# Patient Record
Sex: MALE | Race: WHITE | NOT HISPANIC OR LATINO | Employment: FULL TIME | URBAN - METROPOLITAN AREA
[De-identification: names, ages, dates, MRNs, and addresses within clinical notes are randomized per-mention and may not be internally consistent; named-entity substitution may affect disease eponyms.]

---

## 2017-03-23 ENCOUNTER — COMMUNICATION - HEALTHEAST (OUTPATIENT)
Dept: FAMILY MEDICINE | Facility: CLINIC | Age: 57
End: 2017-03-23

## 2017-03-23 DIAGNOSIS — F41.8 PERFORMANCE ANXIETY: ICD-10-CM

## 2017-04-24 ENCOUNTER — OFFICE VISIT - HEALTHEAST (OUTPATIENT)
Dept: FAMILY MEDICINE | Facility: CLINIC | Age: 57
End: 2017-04-24

## 2017-04-24 DIAGNOSIS — Z00.00 ROUTINE ADULT HEALTH MAINTENANCE: ICD-10-CM

## 2017-04-24 DIAGNOSIS — N40.0 BPH (BENIGN PROSTATIC HYPERPLASIA): ICD-10-CM

## 2017-04-24 DIAGNOSIS — F41.8 PERFORMANCE ANXIETY: ICD-10-CM

## 2017-04-24 DIAGNOSIS — N52.9 ERECTILE DYSFUNCTION: ICD-10-CM

## 2017-04-24 LAB
CHOLEST SERPL-MCNC: 193 MG/DL
FASTING STATUS PATIENT QL REPORTED: YES
HBA1C MFR BLD: 5.3 % (ref 3.5–6)
HDLC SERPL-MCNC: 62 MG/DL
LDLC SERPL CALC-MCNC: 118 MG/DL
PSA SERPL-MCNC: 0.3 NG/ML (ref 0–3.5)
TRIGL SERPL-MCNC: 63 MG/DL

## 2017-04-24 ASSESSMENT — MIFFLIN-ST. JEOR: SCORE: 1631.13

## 2017-04-27 ENCOUNTER — COMMUNICATION - HEALTHEAST (OUTPATIENT)
Dept: FAMILY MEDICINE | Facility: CLINIC | Age: 57
End: 2017-04-27

## 2017-05-02 ENCOUNTER — OFFICE VISIT - HEALTHEAST (OUTPATIENT)
Dept: FAMILY MEDICINE | Facility: CLINIC | Age: 57
End: 2017-05-02

## 2017-05-02 DIAGNOSIS — S39.012A LOW BACK STRAIN, INITIAL ENCOUNTER: ICD-10-CM

## 2017-05-02 DIAGNOSIS — M62.830 SPASM OF MUSCLE, BACK: ICD-10-CM

## 2017-08-02 ENCOUNTER — COMMUNICATION - HEALTHEAST (OUTPATIENT)
Dept: FAMILY MEDICINE | Facility: CLINIC | Age: 57
End: 2017-08-02

## 2017-08-02 DIAGNOSIS — F41.8 PERFORMANCE ANXIETY: ICD-10-CM

## 2020-01-29 ENCOUNTER — RECORDS - HEALTHEAST (OUTPATIENT)
Dept: ADMINISTRATIVE | Facility: OTHER | Age: 60
End: 2020-01-29

## 2020-10-27 ENCOUNTER — OFFICE VISIT - HEALTHEAST (OUTPATIENT)
Dept: FAMILY MEDICINE | Facility: CLINIC | Age: 60
End: 2020-10-27

## 2020-10-27 DIAGNOSIS — L98.9 SKIN LESION: ICD-10-CM

## 2020-10-27 DIAGNOSIS — H53.9 VISION CHANGES: ICD-10-CM

## 2020-10-27 DIAGNOSIS — M25.511 RIGHT SHOULDER PAIN, UNSPECIFIED CHRONICITY: ICD-10-CM

## 2020-10-27 DIAGNOSIS — Z12.11 SPECIAL SCREENING FOR MALIGNANT NEOPLASMS, COLON: ICD-10-CM

## 2020-10-27 ASSESSMENT — MIFFLIN-ST. JEOR: SCORE: 1616.31

## 2021-04-07 ENCOUNTER — OFFICE VISIT - HEALTHEAST (OUTPATIENT)
Dept: FAMILY MEDICINE | Facility: CLINIC | Age: 61
End: 2021-04-07

## 2021-04-07 DIAGNOSIS — Z12.11 SCREEN FOR COLON CANCER: ICD-10-CM

## 2021-04-07 DIAGNOSIS — L98.8 SKIN PLAQUE: ICD-10-CM

## 2021-04-07 DIAGNOSIS — Z00.00 ROUTINE GENERAL MEDICAL EXAMINATION AT A HEALTH CARE FACILITY: ICD-10-CM

## 2021-04-07 DIAGNOSIS — Z12.5 SCREENING FOR PROSTATE CANCER: ICD-10-CM

## 2021-04-07 DIAGNOSIS — Z13.220 LIPID SCREENING: ICD-10-CM

## 2021-04-07 DIAGNOSIS — Z11.59 ENCOUNTER FOR HEPATITIS C SCREENING TEST FOR LOW RISK PATIENT: ICD-10-CM

## 2021-04-07 DIAGNOSIS — Z11.4 SCREENING FOR HIV WITHOUT PRESENCE OF RISK FACTORS: ICD-10-CM

## 2021-04-07 LAB
ALBUMIN SERPL-MCNC: 4 G/DL (ref 3.5–5)
ALP SERPL-CCNC: 69 U/L (ref 45–120)
ALT SERPL W P-5'-P-CCNC: 28 U/L (ref 0–45)
ANION GAP SERPL CALCULATED.3IONS-SCNC: 9 MMOL/L (ref 5–18)
AST SERPL W P-5'-P-CCNC: 26 U/L (ref 0–40)
BILIRUB SERPL-MCNC: 0.8 MG/DL (ref 0–1)
BUN SERPL-MCNC: 17 MG/DL (ref 8–22)
CALCIUM SERPL-MCNC: 8.8 MG/DL (ref 8.5–10.5)
CHLORIDE BLD-SCNC: 106 MMOL/L (ref 98–107)
CHOLEST SERPL-MCNC: 210 MG/DL
CO2 SERPL-SCNC: 22 MMOL/L (ref 22–31)
CREAT SERPL-MCNC: 0.78 MG/DL (ref 0.7–1.3)
ERYTHROCYTE [DISTWIDTH] IN BLOOD BY AUTOMATED COUNT: 12.5 % (ref 11–14.5)
FASTING STATUS PATIENT QL REPORTED: YES
GFR SERPL CREATININE-BSD FRML MDRD: >60 ML/MIN/1.73M2
GLUCOSE BLD-MCNC: 87 MG/DL (ref 70–125)
HCT VFR BLD AUTO: 45 % (ref 40–54)
HDLC SERPL-MCNC: 69 MG/DL
HGB BLD-MCNC: 15.3 G/DL (ref 14–18)
HIV 1+2 AB+HIV1 P24 AG SERPL QL IA: NEGATIVE
LDLC SERPL CALC-MCNC: 129 MG/DL
MCH RBC QN AUTO: 30.4 PG (ref 27–34)
MCHC RBC AUTO-ENTMCNC: 34 G/DL (ref 32–36)
MCV RBC AUTO: 89 FL (ref 80–100)
PLATELET # BLD AUTO: 201 THOU/UL (ref 140–440)
PMV BLD AUTO: 9.1 FL (ref 7–10)
POTASSIUM BLD-SCNC: 4.2 MMOL/L (ref 3.5–5)
PROT SERPL-MCNC: 6.5 G/DL (ref 6–8)
PSA SERPL-MCNC: 0.5 NG/ML (ref 0–4.5)
RBC # BLD AUTO: 5.04 MILL/UL (ref 4.4–6.2)
SODIUM SERPL-SCNC: 137 MMOL/L (ref 136–145)
TRIGL SERPL-MCNC: 62 MG/DL
WBC: 3.9 THOU/UL (ref 4–11)

## 2021-04-07 RX ORDER — TRIAMCINOLONE ACETONIDE 0.25 MG/G
CREAM TOPICAL
Qty: 15 G | Refills: 0 | Status: SHIPPED | OUTPATIENT
Start: 2021-04-07 | End: 2024-01-29

## 2021-04-07 ASSESSMENT — MIFFLIN-ST. JEOR: SCORE: 1632.83

## 2021-04-08 ENCOUNTER — COMMUNICATION - HEALTHEAST (OUTPATIENT)
Dept: FAMILY MEDICINE | Facility: CLINIC | Age: 61
End: 2021-04-08

## 2021-04-08 LAB — HCV AB SERPL QL IA: NEGATIVE

## 2021-05-15 ENCOUNTER — OFFICE VISIT - HEALTHEAST (OUTPATIENT)
Dept: FAMILY MEDICINE | Facility: CLINIC | Age: 61
End: 2021-05-15

## 2021-05-15 DIAGNOSIS — U07.1 BRONCHITIS DUE TO COVID-19 VIRUS: ICD-10-CM

## 2021-05-15 DIAGNOSIS — J40 BRONCHITIS DUE TO COVID-19 VIRUS: ICD-10-CM

## 2021-05-15 DIAGNOSIS — R06.02 SOB (SHORTNESS OF BREATH): ICD-10-CM

## 2021-05-15 RX ORDER — ALBUTEROL SULFATE 90 UG/1
2 AEROSOL, METERED RESPIRATORY (INHALATION) EVERY 4 HOURS PRN
Qty: 1 EACH | Refills: 1 | Status: SHIPPED | OUTPATIENT
Start: 2021-05-15 | End: 2023-10-13

## 2021-05-27 VITALS
DIASTOLIC BLOOD PRESSURE: 76 MMHG | WEIGHT: 187.38 LBS | HEART RATE: 56 BPM | SYSTOLIC BLOOD PRESSURE: 114 MMHG | OXYGEN SATURATION: 96 % | TEMPERATURE: 97.8 F

## 2021-05-28 ENCOUNTER — RECORDS - HEALTHEAST (OUTPATIENT)
Dept: ADMINISTRATIVE | Facility: CLINIC | Age: 61
End: 2021-05-28

## 2021-05-30 ENCOUNTER — RECORDS - HEALTHEAST (OUTPATIENT)
Dept: ADMINISTRATIVE | Facility: CLINIC | Age: 61
End: 2021-05-30

## 2021-05-30 VITALS — WEIGHT: 186 LBS | HEIGHT: 69 IN | BODY MASS INDEX: 27.55 KG/M2

## 2021-06-01 ENCOUNTER — RECORDS - HEALTHEAST (OUTPATIENT)
Dept: ADMINISTRATIVE | Facility: CLINIC | Age: 61
End: 2021-06-01

## 2021-06-05 VITALS
HEIGHT: 68 IN | HEART RATE: 60 BPM | WEIGHT: 185.2 LBS | RESPIRATION RATE: 16 BRPM | DIASTOLIC BLOOD PRESSURE: 70 MMHG | OXYGEN SATURATION: 98 % | SYSTOLIC BLOOD PRESSURE: 101 MMHG | BODY MASS INDEX: 28.07 KG/M2

## 2021-06-05 VITALS
HEART RATE: 55 BPM | HEIGHT: 68 IN | TEMPERATURE: 97.5 F | BODY MASS INDEX: 28.64 KG/M2 | DIASTOLIC BLOOD PRESSURE: 71 MMHG | SYSTOLIC BLOOD PRESSURE: 105 MMHG | WEIGHT: 189 LBS

## 2021-06-10 NOTE — PROGRESS NOTES
Assessment:      Healthy male exam.    Erectile dysfunction    Plan:   `  Labs include: CBC, CMP, hemoglobin A1c, testosterone, lipid cascade, PSA  Referral to urology for further evaluation of erectile dysfunction, BPH  Refills of propranolol and finasteride  Follow-up in 1 year for annual exam or sooner as needed.  Subjective:      Braden Aleman is a 56 y.o. male who presents for an annual exam. The patient reports that there is not domestic violence in his life.     Patient has several complaints today.  Erectile dysfunction: Has been ongoing lately, difficulty getting an erection, decreased libido.  Patient denies any history of coronary artery disease, diabetes, circulatory diseases.  Denies mental illness including depression or anxiety.  No significant stress in his life.  Has had a history of BPH is been a while since he seen a urologist for this has been taking finasteride in the past for it.  Would like to have his testosterone and PSA checked today.    Healthy Habits:   Regular Exercise: Yes  Baseball, running, lifting every day  Sunscreen Use: Yes  Healthy Diet: Yes  Dental Visits Regularly: No  Seat Belt: Yes  Sexually active: Yes  Testicular self awareness:  Yes  Hemoccults: No  Flex Sig: N/A  Colonoscopy: Yes 2011  Lipid Profile: Yes  Glucose Screen: Yes  Prevention of Osteoporosis: N/A  Last Dexa: N/A  Guns at Home:  Yes  Guns Safety Locks:  No      Immunization History   Administered Date(s) Administered     Hep A, Adult 02/17/2014     Hep A, historic 12/27/2007     Influenza, inj, historic 12/27/2007     Influenza, seasonal,quad inj 36+ mos 10/27/2015     Influenza, seasonal,quad inj 6-35 mos 10/05/2010, 09/16/2011, 01/14/2013     Influenza,seasonal quad, PF 02/17/2014     Td, adult adsorbed, PF 10/27/2015     Tdap 03/22/2006     Immunization status: up to date and documented.    No exam data present     Current Outpatient Prescriptions   Medication Sig Dispense Refill     ascorbic acid (ASCORBIC  ACID WITH LENA HIPS) 500 MG tablet Take 500 mg by mouth daily.       b complex vitamins tablet Take 1 tablet by mouth daily.       CALCIUM CARB/MAGNESIUM CMB #10 (MAYITO-MAG ORAL) Take by mouth daily.       GLUCOSAM SUL NA/CHONDR BOYCE A NA (GLUCOSAMINE & CHONDROIT SUL.NA ORAL) Take by mouth daily.       multivitamin capsule Take 1 capsule by mouth daily.       omega-3 fatty acids (FISH OIL) 500 mg cap Take by mouth daily.       propranolol (INDERAL) 10 MG tablet TAKE 1 OR 2 TABLETS BY MOUTH EVERY DAY AS NEEDED. 30 tablet 3     protein Powd Take by mouth daily.       SUMAtriptan (IMITREX) 50 MG tablet Take 50 mg by mouth every 2 (two) hours as needed for migraine.       finasteride (PROSCAR) 5 mg tablet Take 1 tablet (5 mg total) by mouth daily. 90 tablet 3     No current facility-administered medications for this visit.      Past Medical History:   Diagnosis Date     Kidney stones      No past surgical history on file.  Review of patient's allergies indicates no known allergies.  Family History   Problem Relation Age of Onset     Breast cancer Mother      Multiple sclerosis Mother      Heart disease Sister      Depression Sister      Multiple sclerosis Sister      Social History     Social History     Marital status:      Spouse name: N/A     Number of children: N/A     Years of education: N/A     Occupational History           Social History Main Topics     Smoking status: Passive Smoke Exposure - Never Smoker     Smokeless tobacco: Not on file      Comment: spouse outside     Alcohol use Yes      Comment: 2-4 drinks per week     Drug use: No     Sexual activity: Not on file     Other Topics Concern     Not on file     Social History Narrative       Review of Systems  Review of Systems      Review of Systems  General:  Denies problem  Eyes: Denies problem  Ears/Nose/Throat: Denies problem  Cardiovascular: Denies problem  Respiratory:  Denies problem  Gastrointestinal:  Denies problem  Genitourinary:  "increased problems with having erections, less of a labido  Musculoskeletal:  Denies problem  Skin: Denies problem  Neurologic: occasional lightheadedness after working; has been awhile since episode  Psychiatric: Denies problem  Endocrine: Denies problem  Heme/Lymphatic: Denies problem   Allergic/Immunologic: Denies problem    Objective:     Vitals:    04/24/17 1026   BP: 106/70   Pulse: (!) 58   Resp: 16   Temp: 98.4  F (36.9  C)   TempSrc: Oral   Weight: 186 lb (84.4 kg)   Height: 5' 8.5\" (1.74 m)     Body mass index is 27.87 kg/(m^2).    Physical  Physical Exam     Vitals:    04/24/17 1026   BP: 106/70   Pulse: (!) 58   Resp: 16   Temp: 98.4  F (36.9  C)   TempSrc: Oral   Weight: 186 lb (84.4 kg)   Height: 5' 8.5\" (1.74 m)     Body mass index is 27.87 kg/(m^2).    Physical  General Appearance: Alert, cooperative, no distress, appears stated age  Head: Normocephalic, without obvious abnormality, atraumatic  Eyes: PERRL, conjunctiva/corneas clear, EOM's intact  Ears: Normal TM's and external ear canals, both ears  Nose: Nares normal, septum midline,mucosa normal, no drainage  Throat: Lips, mucosa, and tongue normal; teeth and gums normal  Neck: Supple, symmetrical, trachea midline, no adenopathy;  thyroid: not enlarged, symmetric, no tenderness/mass/nodules  Back: Symmetric, no curvature, ROM normal, no CVA tenderness  Lungs: Clear to auscultation bilaterally, respirations unlabored  Heart: Regular rate and rhythm, S1 and S2 normal, no murmur, rub, or gallop,  Abdomen: Soft, non-tender, bowel sounds active all four quadrants,  no masses, no organomegaly  Genitourinary: deferred  Musculoskeletal: Normal range of motion. No joint swelling or deformity.   Extremities: Extremities normal, atraumatic, no cyanosis or edema  Skin: Skin color, texture, turgor normal, no rashes or lesions  Lymph nodes: Cervical, supraclavicular, and axillary nodes normal  Neurologic: He is alert. He has normal reflexes. Cranial nerves " intact., normal balance  Psychiatric: He has a normal mood and affect.

## 2021-06-10 NOTE — PROGRESS NOTES
Assessment & Plan:  1. Spasm of muscle, back  Flexeril nightly to relax musculature.   - cyclobenzaprine (FLEXERIL) 5 MG tablet; Take 1 tablet (5 mg total) by mouth every 8 (eight) hours as needed for muscle spasms.  Dispense: 20 tablet; Refill: 0    2. Low back strain, initial encounter  Negative leg raise, pain localized and reproducible with palpation. We will trial flexeril nightly, as well as Aleve on a scheduled basis for 1-2 weeks. If symptoms not improving or worsening, consider imaging. Patient also encouraged to use heat and ice as needed. May also use topical analgesics such as capsaicin ointment.       Patient Instructions           No orders of the defined types were placed in this encounter.    There are no discontinued medications.        Chief Complaint:   Chief Complaint   Patient presents with     Back Pain     back pain that goes into the left side/hip        History of Present Illness:  Braden is a 56 y.o. male presenting to the clinic today with left-sided low back pain that began about 4 days ago.  Pain started to be bothersome after the patient pitched in a softball game over the weekend.  It was tolerable at that point but then he went to work on his boat on Sunday and was in odd positions all day.  After finishing with the boat he noticed worsening pain in the left low back.  Pain radiates but no further than the side or the hip.  No shooting pains into the buttocks or down the leg.  Pain feels like spasm.  He has tried heat and ice at home but no other over-the-counter medications.  No history of other injury prior to the spine or low back..     Review of Systems:  All other systems are negative except as noted above.    PFS:  Reviewed and updated.     Tobacco Use:  History   Smoking Status     Passive Smoke Exposure - Never Smoker   Smokeless Tobacco     Not on file     Comment: spouse outside       Vitals:  Vitals:    05/02/17 1353   BP: 124/84   Patient Site: Right Arm   Patient  Position: Sitting   Cuff Size: Adult Regular   Pulse: 74   Resp: 16     Wt Readings from Last 3 Encounters:   04/24/17 186 lb (84.4 kg)   10/27/15 186 lb 4 oz (84.5 kg)       Physical Exam:  Constitutional:  Reveals an alert, cooperative, 56 year old male in no acute distress.  Vitals:  Per nursing notes.  Musculoskeletal: left low  to palpation, pain reproducible with palpation. straight leg raise negative bilaterally.  Psychiatric:  Mood appropriate, memory intact.     Data Reviewed:  Additional History from Old Records or Another Person Summarized (2 total): None.     Decision to Obtain Extra information (1 total): None.     Radiology Tests Summarized and Ordered (XRAY/CT/MRI/DXA) (1 total): None.    Labs Reviewed and Ordered (1 total): None.    Medicine Tests Summarized and Ordered (EKG/ECHO/COLONOSCOPY/EGD) (1 total): None.    Independent Review of EKG or X-Ray (2 each): None.    The visit lasted a total of 25 minutes face to face with the patient. Over 50% of the time was spent counseling and educating the patient about plan of care.    Medications:  Current Outpatient Prescriptions   Medication Sig Dispense Refill     ascorbic acid (ASCORBIC ACID WITH LENA HIPS) 500 MG tablet Take 500 mg by mouth daily.       b complex vitamins tablet Take 1 tablet by mouth daily.       CALCIUM CARB/MAGNESIUM CMB #10 (MAYITO-MAG ORAL) Take by mouth daily.       finasteride (PROSCAR) 5 mg tablet Take 1 tablet (5 mg total) by mouth daily. 90 tablet 3     GLUCOSAM SUL NA/CHONDR BOYCE A NA (GLUCOSAMINE & CHONDROIT SUL.NA ORAL) Take by mouth daily.       multivitamin capsule Take 1 capsule by mouth daily.       omega-3 fatty acids (FISH OIL) 500 mg cap Take by mouth daily.       propranolol (INDERAL) 10 MG tablet TAKE 1 OR 2 TABLETS BY MOUTH EVERY DAY AS NEEDED. 30 tablet 3     protein Powd Take by mouth daily.       SUMAtriptan (IMITREX) 50 MG tablet Take 50 mg by mouth every 2 (two) hours as needed for migraine.        cyclobenzaprine (FLEXERIL) 5 MG tablet Take 1 tablet (5 mg total) by mouth every 8 (eight) hours as needed for muscle spasms. 20 tablet 0     No current facility-administered medications for this visit.        Total Data Points: 0    ALLIOSN Yu, CNP    This note has been dictated using voice recognition software. Any grammatical or context distortions are unintentional and inherent to the software

## 2021-06-12 NOTE — PROGRESS NOTES
Assessment:     1. Vision changes  Ambulatory referral to Ophthalmology   2. Skin lesion  Ambulatory referral to Dermatology   3. Special screening for malignant neoplasms, colon  Ambulatory referral for Colonoscopy   4. Right shoulder pain, unspecified chronicity       Referral to ophthalmology.  Referral to dermatology.  Regarding right shoulder pain, shoulder rehab exercises back is provided to the patient.  Patient does have orthopedics that he can follow-up if not improving or worsening.  Patient verbalizes understanding.     Plan:      The diagnosis was discussed with the patient and evaluation and treatment plans outlined.  Follow up: Return in about 3 months (around 1/27/2021) for Annual physical.     Subjective:      Braden Aleman is a  male who presents for evaluation of   Chief Complaint   Patient presents with     Eye Problem     Pt here today to evaluate clouding/ film in vision of LT eye, sees dark black spotLT side mild HA x 1 d     Flu Vaccine     Flublok     Skin Problem     Pt has a couple spots on chest that has been itching     Patient works at the [employer].  Yesterday morning he woke up and felt that his left eye is cloudy and then he felt some floaters in his left eye.  He has had floaters in the eye in the past also but his vision with cloudiness was not present.  He denies any curtain coming in his front of your loss of vision.  These leading questions are asked because there is family history of multiple sclerosis.  He does not report any dizziness or any neurological signs or symptoms.  He does play baseball in the community and is actually going to [place] on the weekend to participate in baseball matches.  He wants to make sure that he is safe to travel.  He denies any eye injury.  Patient has had a couple of spots on his right chest and was asked to monitor it and return if they are changing of the become itchy.  He feels they are enlarging and are little rough and itchy  "now.  Patient has had bilateral rotator cuff repair but now feels that his right shoulder is aching due to his baseball.  He is started doing his exercises for conditioning again.    The following portions of the patient's history were reviewed and updated as appropriate: allergies, current medications, past family history, past medical history, past social history, past surgical history and problem list.    Review of Systems  Constitutional: negative  Respiratory: negative  Cardiovascular: negative  Gastrointestinal: negative       Objective:   /70 (Patient Site: Left Arm, Patient Position: Sitting, Cuff Size: Adult Large)   Pulse 60   Resp 16   Ht 5' 7.8\" (1.722 m)   Wt 185 lb 3.2 oz (84 kg)   SpO2 98%   BMI 28.33 kg/m    GENERAL: Healthy, alert and no distress  EYES: Eyes grossly normal to inspection, PERRL, EOMI, visual fields normal, conjunctivae and sclerae normal and fundi benign-no diabetic or hypertensive changes seen  RESP: No audible wheeze, cough, or visible cyanosis.  No visible retractions or increased work of breathing.    MS: Examination of the right shoulder does not show any obvious deformity.  Impingement sign is positive.  Apley's maneuver is negative.  NEURO: Cranial nerves grossly intact. Mentation and speech appropriate for age.  PSYCH: Mentation appears normal, affect normal/bright, judgement and insight intact, normal speech and appearance well-groomed  Skin exam: Lentiginous lesion and possible actinic keratosis lesions on the right upper chest.  "

## 2021-06-15 PROBLEM — N52.9 ERECTILE DYSFUNCTION: Status: ACTIVE | Noted: 2017-04-24

## 2021-06-16 NOTE — PROGRESS NOTES
Assessment/ Plan     1. Routine general medical examination at a health care facility  Reviewed and updated patient's past medical, surgical, family, social history, along with current medications and allergies.  Reviewed general healthy living lifestyle guidelines.  He will contact his insurance company regarding Shingrix.  Updated patient's health maintenance as further outlined below.  - Comprehensive Metabolic Panel  - HM2(CBC w/o Differential)    2. Screen for colon cancer  - Ambulatory referral for Colonoscopy    3. Encounter for hepatitis C screening test for low risk patient  - Hepatitis C Antibody (Anti-HCV)    4. Screening for HIV without presence of risk factors  - HIV Antigen/Antibody Screening [place]    5. Lipid screening  - Lipid [place] FASTING    6. Screening for prostate cancer  - PSA, Annual Screen (Prostatic-Specific Antigen)    7. Skin plaque  Left knee plaque on exam today, discussed short course of triamcinolone and then using topical coconut oil which has been helpful in the past.  - triamcinolone (KENALOG) 0.025 % cream; Apply to affected area twice daily for not more than 10 consecutive days  Dispense: 15 g; Refill: 0    Subjective:     Braden Aleman is a 60 y.o. male who presents for an annual exam.      Other concerns addressed:  Just before Evelin developed skin lesion on his left knee.  It is thick, flaky, and dry.  It improved with coconut oil but then returned when he stopped using this.    Healthy Habits:   Healthy Diet: Yes, wife is diabetic, two meals per day, not a snacker  Regular Exercise: Yes, plays baseball recently restarted, running  Seat Belt: Yes  Dental Visits Regularly: No  ------------------  Last Colonoscopy (50 -76yo): 2011, normal, every 10 years  Last annual blood work and any abnormalities?: 2017  Low dose CT (30 pack year, 55-76yo q1y): n/a  AAA (65-76yo x1 ever smoked): n/a  PSA (55-69yo): 2017, normal    Immunization History   Administered Date(s)  Administered     Hep A, Adult IM (19yr & older) 02/17/2014     Hep A, historic 12/27/2007     INFLUENZA,RECOMBINANT,INJ,PF QUADRIVALENT 18+YRS 10/27/2020     Influenza, inj, historic,unspecified 12/27/2007     Influenza, seasonal,quad inj 6-35 mos 10/05/2010, 09/16/2011, 01/14/2013     Influenza,seasonal quad, PF 02/17/2014     Influenza,seasonal,quad inj =/> 6months 10/27/2015     Td, adult adsorbed, PF 10/27/2015     Tdap 03/22/2006     Immunization status: up to date and documented.     Health Maintenance   Topic Date Due     HEPATITIS C SCREENING  Never done     HIV SCREENING  Never done     COVID-19 Vaccine (1) Never done     ADVANCE CARE PLANNING  Never done     ZOSTER VACCINES (1 of 2) Never done     PREVENTIVE CARE VISIT  04/24/2018     COLORECTAL CANCER SCREENING  03/10/2021     LIPID  04/24/2022     TD 18+ HE  10/27/2025     INFLUENZA VACCINE RULE BASED  Completed     TDAP ADULT ONE TIME DOSE  Completed     Pneumococcal Vaccine: Pediatrics (0 to 5 Years) and At-Risk Patients (6 to 64 Years)  Aged Out     HEPATITIS B VACCINES  Aged Out       Current Outpatient Medications   Medication Sig Dispense Refill     b complex vitamins tablet Take 1 tablet by mouth daily.       multivitamin capsule Take 1 capsule by mouth daily.       triamcinolone (KENALOG) 0.025 % cream Apply to affected area twice daily for not more than 10 consecutive days 15 g 0     No current facility-administered medications for this visit.      Past Medical History:   Diagnosis Date     Kidney stones      Past Surgical History:   Procedure Laterality Date     ROTATOR CUFF REPAIR Bilateral      Patient has no known allergies.  Family History   Problem Relation Age of Onset     Breast cancer Mother      Multiple sclerosis Mother      Depression Sister      Multiple sclerosis Sister      No Medical Problems Father      Social History     Socioeconomic History     Marital status:      Spouse name: Not on file     Number of children: Not  "on file     Years of education: Not on file     Highest education level: Not on file   Occupational History     Occupation:    Social Needs     Financial resource strain: Not on file     Food insecurity     Worry: Not on file     Inability: Not on file     Transportation needs     Medical: Not on file     Non-medical: Not on file   Tobacco Use     Smoking status: Passive Smoke Exposure - Never Smoker     Smokeless tobacco: Never Used     Tobacco comment: spouse outside   Substance and Sexual Activity     Alcohol use: Yes     Frequency: 2-3 times a week     Comment: 2-4 drinks per week     Drug use: No     Sexual activity: Not on file   Lifestyle     Physical activity     Days per week: Not on file     Minutes per session: Not on file     Stress: Not on file   Relationships     Social connections     Talks on phone: Not on file     Gets together: Not on file     Attends Oriental orthodox service: Not on file     Active member of club or organization: Not on file     Attends meetings of clubs or organizations: Not on file     Relationship status: Not on file     Intimate partner violence     Fear of current or ex partner: Not on file     Emotionally abused: Not on file     Physically abused: Not on file     Forced sexual activity: Not on file   Other Topics Concern     Not on file   Social History Narrative    Works at the post office.  Patient is  and lives with his wife.  Plays [employer] baseball.       Review of Systems  12 point ROS positive for what is indicated in HPI, otherwise negative.      Objective:     Physical Exam:  /71   Pulse (!) 55   Temp 97.5  F (36.4  C) (Oral)   Ht 5' 7.75\" (1.721 m)   Wt 189 lb (85.7 kg)   BMI 28.95 kg/m      General appearance: Alert, cooperative, no distress, appears stated age  Head: Normocephalic, atraumatic, without obvious abnormality  Ears: Tm's gray dull with structures seen bilaterally  Eyes: Pupils equal round, reactive.  Conjunctiva " clear.  Nose: Nares normal, no drainage.  Throat: Lips, mucosa, tongue normal mucosa pink and moist  Neck: Supple, symmetric, trachea midline, no adenopathy.  No thyroid enlargement, tenderness or nodules.    Back: Symmetric  Lungs: Clear to auscultation bilaterally, no wheezing or crackles present.  Respirations unlabored  Heart: Regular rate and rhythm, normal S1 and S2, no murmur, rub or gallop.  Abdomen: Soft, nontender, nondistended. No masses or organomegaly.  Extremities: Extremities normal, atraumatic.  No cyanosis or edema.  Skin: 5 cm dry, flaky plaque left knee, smaller less significant plaque right knee  Neurologic: CN II through XII intact, normal strength.  Psych: mood neutral and affect appropriate    Results for orders placed or performed in visit on 04/24/17   Lipid Cascade   Result Value Ref Range    Cholesterol 193 <=199 mg/dL    Triglycerides 63 <=149 mg/dL    HDL Cholesterol 62 >=40 mg/dL    LDL Calculated 118 <=129 mg/dL    Patient Fasting > 8hrs? Yes    Comprehensive Metabolic Panel   Result Value Ref Range    Sodium 141 136 - 145 mmol/L    Potassium 4.3 3.5 - 5.0 mmol/L    Chloride 107 98 - 107 mmol/L    CO2 22 22 - 31 mmol/L    Anion Gap, Calculation 12 5 - 18 mmol/L    Glucose 75 70 - 125 mg/dL    BUN 15 8 - 22 mg/dL    Creatinine 0.89 0.70 - 1.30 mg/dL    GFR MDRD Af Amer >60 >60 mL/min/1.73m2    GFR MDRD Non Af Amer >60 >60 mL/min/1.73m2    Bilirubin, Total 1.0 0.0 - 1.0 mg/dL    Calcium 9.1 8.5 - 10.5 mg/dL    Protein, Total 6.7 6.0 - 8.0 g/dL    Albumin 4.0 3.5 - 5.0 g/dL    Alkaline Phosphatase 73 45 - 120 U/L    AST 31 0 - 40 U/L    ALT 27 0 - 45 U/L   PSA, Annual Screen (Prostatic-Specific Antigen)   Result Value Ref Range    PSA 0.3 0.0 - 3.5 ng/mL   HM2(CBC w/o Differential)   Result Value Ref Range    WBC 4.1 4.0 - 11.0 thou/uL    RBC 5.22 4.40 - 6.20 mill/uL    Hemoglobin 15.9 14.0 - 18.0 g/dL    Hematocrit 47.9 40.0 - 54.0 %    MCV 92 80 - 100 fL    MCH 30.6 27.0 - 34.0 pg     MCHC 33.3 32.0 - 36.0 g/dL    RDW 11.9 11.0 - 14.5 %    Platelets 210 140 - 440 thou/uL    MPV 7.9 7.0 - 10.0 fL   Testosterone, Total and Free   Result Value Ref Range    Testosterone, Free 9.20 3.87 - 14.7 ng/dL    Testosterone, Total 657 240 - 950 ng/dL   Glycosylated Hemoglobin A1C   Result Value Ref Range    Hemoglobin A1c 5.3 3.5 - 6.0 %       Susanne Mcfarland DO

## 2021-06-18 NOTE — PATIENT INSTRUCTIONS - HE
Patient Instructions by Carmelo Han CNP at 5/15/2021 11:20 AM     Author: Carmelo Han CNP Service: -- Author Type: Nurse Practitioner    Filed: 5/15/2021 12:24 PM Encounter Date: 5/15/2021 Status: Signed    : Carmelo Han CNP (Nurse Practitioner)         Patient Education     Viral Bronchitis (Adult)    You have a viral bronchitis. Bronchitis is inflammation and swelling of the lining of the lungs. This is often caused by an infection. Symptoms include a dry, hacking cough that is worse at night. The cough may bring up yellow-green mucus. You may also feel short of breath or wheeze. Other symptoms may include tiredness, chest discomfort, and chills.  Bronchitis that is caused by a virus is not treated with antibiotics. Instead, medicines may be given to help relieve symptoms. Symptoms can last up to 2 weeks, although the cough may last much longer.  This illness is contagious during the first few days and is spread through the air by coughing and sneezing, or by direct contact (touching the sick person and then touching your own eyes, nose, or mouth).  Most viral illnesses resolve within 10 to 14 days with rest and simple home remedies, although they may sometimes last for several weeks.  Home care    If symptoms are severe, rest at home for the first 2 to 3 days. When you go back to your usual activities, don't let yourself get too tired.    Do not smoke. Also avoid being exposed to secondhand smoke.    You may use over-the-counter medicine to control fever or pain, unless another pain medicine was prescribed. If you have chronic liver or kidney disease or have ever had a stomach ulcer or gastrointestinal bleeding, talk with your healthcare provider before using these medicines. Also talk to your provider if you are taking medicine to prevent blood clots. Aspirin should never be given to anyone younger than 18 years of age who is ill with a viral infection or fever. It may cause severe  liver or brain damage.    Your appetite may be poor, so a light diet is fine. Avoid dehydration by drinking 6 to 8 glasses of fluids per day (such as water, soft drinks, sports drinks, juices, tea, or soup). Extra fluids will help loosen secretions in the nose and lungs.    Over-the-counter cough, cold, and sore-throat medicines will not shorten the length of the illness, but they may help to reduce symptoms. Don't use decongestants if you have high blood pressure.  Follow-up care  Follow up with your healthcare provider, or as advised. If you had an X-ray or ECG (electrocardiogram), a specialist will review it. You will be notified of any new findings that may affect your care.  If you are age 65 or older, or if you have a chronic lung disease or condition that affects your immune system, or you smoke, ask your healthcare provider about getting a pneumococcal vaccine and a yearly flu shot (influenza vaccine).  When to seek medical advice  Call your healthcare provider right away if any of these occur:    Fever of 100.4 F (38 C) or higher, or as directed by your healthcare provider    Coughing up increased amounts of colored sputum    Weakness, drowsiness, headache, facial pain, ear pain, or a stiff neck  Call 911  Call 911 if any of these occur:    Coughing up blood    Worsening weakness, drowsiness, headache, or stiff neck    Trouble breathing, wheezing, or pain with breathing  Date Last Reviewed: 9/13/2015 2000-2017 [employer]. [address] [place] [id number]. All rights reserved. This information is not intended as a substitute for professional medical care. Always follow your healthcare professional's instructions.

## 2021-06-18 NOTE — PATIENT INSTRUCTIONS - HE
Patient Instructions by Feliberto Gilman MD at 10/27/2020  1:10 PM     Author: Feliberto Gilman MD Service: -- Author Type: Physician    Filed: 10/27/2020  1:37 PM Encounter Date: 10/27/2020 Status: Signed    : Feliberto Gilman MD (Physician)       Patient Education     What Are Flashes and Floaters?  Have you ever seen flashes of light, stars, or streaks that arent really there? A few of these flashes are seen by everyone from time to time. Usually you see them in one eye at a time. Flashes are often caused by the gel filling inside of your eye, called the vitreous, pulling on the retina. The retina is a membrane that lines the inside of your eye.  Floaters look like dark specks, clouds, threads, or spider webs moving through your eyesight. Most people see them once in a while. Floaters may be pieces of gel or other material floating inside your eye. They are usually harmless.      Who gets flashes?  As you age or if you are nearsighted, you are more likely to see flashes. Nearsightedness is when you have fuzzy distance vision. Sometimes, flashes are a sign of other eye problems that need care.  Who gets floaters?  The older you get, the more likely youll notice floaters. Floaters can also be caused by an eye injury or surgery. People who are very nearsighted may get more floaters. If floaters appear suddenly or greatly increase in number, see your healthcare provider. This may be a sign of an eye problem.  Date Last Reviewed: 10/1/2017    [id number] [employer]. [address] [place] [address]. All rights reserved. This information is not intended as a substitute for professional medical care. Always follow your healthcare professional's instructions.

## 2021-06-18 NOTE — PATIENT INSTRUCTIONS - HE
Patient Instructions by Susanne Mcfarland DO at 4/7/2021  3:00 PM     Author: Susanne Mcfarland DO Service: -- Author Type: Physician    Filed: 4/7/2021  3:22 PM Encounter Date: 4/7/2021 Status: Addendum    : Susanne Mcfarland DO (Physician)    Related Notes: Original Note by Susanne Mcfarland DO (Physician) filed at 4/7/2021  3:14 PM       Please call your insurance company to check on Shingrix vaccine coverage (the updated shingles vaccine)    Patient Education     Carpal Tunnel Syndrome    Carpal tunnel syndrome is a painful condition of the wrist and arm. It is caused by pressure on the median nerve.  The median nerve is one of the nerves that give feeling and movement to the hand. It passes through a tunnel in the wrist called the carpal tunnel. This tunnel is made up of bones and ligaments. Narrowing of this tunnel or swelling of the tissues inside the tunnel puts pressure on the median nerve. This causes numbness, pins and needles, or electric shooting pains in your hand and forearm. Often the pain is worse at night and may wake you when you are asleep.  Carpal tunnel syndrome may occur during pregnancy and with use of birth control pills. It is more common in workers who must often bend their wrists. It is also common in people who work with power tools that cause strong vibrations.  Home care    Rest the painful wrist. Avoid repeated bending of the wrist back and forth. This puts pressure on the median nerve. Avoid using power tools with strong vibrations.    If you were given a splint, wear it at night while you sleep. You may also wear it during the day for comfort.    Move your fingers and wrists often to avoid stiffness.    Elevate your arms on pillows when you lie down.    Try using the unaffected hand more.    Try not to hold your wrists in a bent, downward position.    Sometimes changes in the work place may ease symptoms. If you type most of the day, it may help to change the position of your  keyboard or add a wrist support. Your wrist should be in a neutral position and not bent back when typing.    You may use over-the-counter pain medicine to treat pain and inflammation, unless another medicine was prescribed. Anti-inflammatory pain medicines, such as ibuprofen or naproxen may be more effective than acetaminophen, which treats pain, but not inflammation. If you have chronic liver or kidney disease or ever had a stomach ulcer or GI bleeding, talk with your doctor before using these medicines.    Opioid pain medicine will only give temporary relief and does not treat the problem. If pain continues, you may need a shot of a steroid drug into your wrist.    If the above methods fail, you may need surgery. This will open the carpal tunnel and release the pressure on the trapped nerve.  Follow-up care  Follow up with your healthcare provider, or as advised, if the pain doesnt begin to improve within the next week.  If X-rays were taken, you will be notified of any new findings that may affect your care.  When to seek medical advice  Call your healthcare provider right away if any of these occur:    Pain not improving with the above treatment    Fingers or hand become cold, blue, numb, or tingly    Your whole arm becomes swollen or weak  Date Last Reviewed: 11/23/2015 2000-2017 [employer]. [address] [place] [address], [place] [address]. All rights reserved. This information is not intended as a substitute for professional medical care. Always follow your healthcare professional's instructions.

## 2021-06-21 NOTE — LETTER
Letter by Susanne Mcfarland DO at      Author: Susanne Mcfarland DO Service: -- Author Type: --    Filed:  Encounter Date: 4/8/2021 Status: (Other)         Braden Aleman  [address] [place] [id number]     April 8, 2021     Dear Mr. Aleman,    Below are the results from your recent visit:    Resulted Orders   Comprehensive Metabolic Panel   Result Value Ref Range    Sodium 137 136 - 145 mmol/L    Potassium 4.2 3.5 - 5.0 mmol/L    Chloride 106 98 - 107 mmol/L    CO2 22 22 - 31 mmol/L    Anion Gap, Calculation 9 5 - 18 mmol/L    Glucose 87 70 - 125 mg/dL    BUN 17 8 - 22 mg/dL    Creatinine 0.78 0.70 - 1.30 mg/dL    GFR MDRD Af Amer >60 >60 mL/min/1.73m2    GFR MDRD Non Af Amer >60 >60 mL/min/1.73m2    Bilirubin, Total 0.8 0.0 - 1.0 mg/dL    Calcium 8.8 8.5 - 10.5 mg/dL    Protein, Total 6.5 6.0 - 8.0 g/dL    Albumin 4.0 3.5 - 5.0 g/dL    Alkaline Phosphatase 69 45 - 120 U/L    AST 26 0 - 40 U/L    ALT 28 0 - 45 U/L    Narrative    Fasting Glucose reference range is 70-99 mg/dL per  American Diabetes Association (ADA) guidelines.   HM2(CBC w/o Differential)   Result Value Ref Range    WBC 3.9 (L) 4.0 - 11.0 thou/uL    RBC 5.04 4.40 - 6.20 mill/uL    Hemoglobin 15.3 14.0 - 18.0 g/dL    Hematocrit 45.0 40.0 - 54.0 %    MCV 89 80 - 100 fL    MCH 30.4 27.0 - 34.0 pg    MCHC 34.0 32.0 - 36.0 g/dL    RDW 12.5 11.0 - 14.5 %    Platelets 201 140 - 440 thou/uL    MPV 9.1 7.0 - 10.0 fL   Lipid Cascade FASTING   Result Value Ref Range    Cholesterol 210 (H) <=199 mg/dL    Triglycerides 62 <=149 mg/dL    HDL Cholesterol 69 >=40 mg/dL    LDL Calculated 129 <=129 mg/dL    Patient Fasting > 8hrs? Yes    PSA, Annual Screen (Prostatic-Specific Antigen)   Result Value Ref Range    PSA 0.5 0.0 - 4.5 ng/mL    Narrative    Method is Abbott Prostate-Specific Antigen (PSA)  Standard-WHO 1st International (90:10)   Hepatitis C Antibody (Anti-HCV)   Result Value Ref Range    Hepatitis C Ab Negative Negative   HIV  Antigen/Antibody Screening Cascade   Result Value Ref Range    HIV Antigen / Antibody Negative Negative    Narrative    Method is Abbott HIV Ag/Ab for the detection of HIV p24 antigen, HIV-1 antibodies and HIV-2 antibodies.       Your results are overall normal.  Your white blood cell count was just slightly low, we will repeat this next year to ensure it is stable.    Please call with questions or contact us using [employer].    Sincerely,    Electronically signed by Susanne Mcfarland, DO

## 2021-06-30 NOTE — PROGRESS NOTES
Progress Notes by Carmelo Han CNP at 5/15/2021 11:20 AM     Author: Carmelo Han CNP Service: -- Author Type: Nurse Practitioner    Filed: 5/15/2021  2:20 PM Encounter Date: 5/15/2021 Status: Signed    : Carmelo Han CNP (Nurse Practitioner)       Chief Complaint   Patient presents with   ? Shortness of Breath     X2-3 weeks, Plays baseball, SOB when he runs, chest tightness   ? Shoulder Pain     X2-3 weeks, R shoulder, pain when he rotates the arm       HPI:Braden Aleman is a 60 y.o. male who presents today complaining of worsening shortness of breath and fatigue post COVID about 5-6 weeks ago. patient reports beng very active athlete, running, playing baseball and has not been able to be active without taking short breaks. Presents with concerns of pneumonia.   Of note, patient also requests specific imaging for chronic right shoulder pain today, no acute injury.     History obtained from the patient.    Problem List:  2017-04: Erectile dysfunction  2014-11: Benign localized hyperplasia of prostate with urinary   obstruction and other lower urinary tract symptoms (LUTS)(600.21)  2014-11: Benign prostatic hyperplasia  Nausea  Joint Pain, Localized In The Knee  Tingling (Paresthesia)  Benign Prostatic Hypertrophy  Joint Pain, Localized In The Shoulder  Deltoid Muscle Strain  Overweight  Anxiety  Alopecia  Nephrolithiasis      Past Medical History:   Diagnosis Date   ? Kidney stones        Social History     Tobacco Use   ? Smoking status: Passive Smoke Exposure - Never Smoker   ? Smokeless tobacco: Never Used   ? Tobacco comment: spouse outside   Substance Use Topics   ? Alcohol use: Yes     Frequency: 2-3 times a week     Comment: 2-4 drinks per week       Review of Systems   Constitutional: Positive for fatigue. Negative for chills and fever.   HENT: Negative for congestion and sore throat.    Respiratory: Positive for chest tightness and shortness of breath. Negative for wheezing.          With running and activity    Gastrointestinal: Negative for diarrhea and vomiting.   Genitourinary: Negative for dysuria.   Musculoskeletal: Positive for arthralgias.   Neurological: Negative for headaches.   All other systems reviewed and are negative.      Vitals:    05/15/21 1126   BP: 114/76   Patient Site: Right Arm   Patient Position: Sitting   Cuff Size: Adult Regular   Pulse: (!) 56   Temp: 97.8  F (36.6  C)   TempSrc: Oral   SpO2: 96%   Weight: 187 lb 6 oz (85 kg)       Physical Exam  Constitutional:       Appearance: Normal appearance. He is not ill-appearing or diaphoretic.   Cardiovascular:      Rate and Rhythm: Normal rate and regular rhythm.      Pulses: Normal pulses.      Heart sounds: Normal heart sounds.   Pulmonary:      Effort: Pulmonary effort is normal. No respiratory distress.      Breath sounds: Normal breath sounds. No stridor. No wheezing, rhonchi or rales.   Skin:     General: Skin is warm.      Capillary Refill: Capillary refill takes less than 2 seconds.   Neurological:      Mental Status: He is alert and oriented to person, place, and time.   Psychiatric:         Behavior: Behavior normal.         No notes on file    Labs:  No results found for this or any previous visit (from the past 72 hour(s)).    Radiology:I have personally ordered and preliminarily reviewed the following xray:  Xr Chest 2 Views    Result Date: 5/15/2021  EXAM DATE:         05/15/2021 EXAM: X-RAY CHEST, 2 VIEWS, FRONTAL AND LATERAL LOCATION: [facility] DATE/TIME: 5/15/2021 12:30 PM INDICATION: post COVID for 5-6 weeks with SOB and cough COMPARISON: None. IMPRESSION: Negative chest.       Clinical Decision Making:At the end of the encounter, I discussed results, diagnosis, medications. Discussed with patient negative xray findings, will treat symptoms as post COVID bronchitis and albuterol inhaler. Encouraged follow up with PCP if not improving to consider an antibiotic course  and/or additional imaging. Red flag symptoms with shortness of breath and indications for follow up discussed. Patient advised to follow up with PCP for ongoing imaging/PT needs for right shoulder concern. Patient understood and agreed to plan.    ALLISON Santo, CNP     1. Bronchitis due to COVID-19 virus  albuterol (PROAIR HFA;PROVENTIL HFA;VENTOLIN HFA) 90 mcg/actuation inhaler   2. SOB (shortness of breath)  XR Chest 2 Views    XR Chest 2 Views         Patient Instructions     Patient Education     Viral Bronchitis (Adult)    You have a viral bronchitis. Bronchitis is inflammation and swelling of the lining of the lungs. This is often caused by an infection. Symptoms include a dry, hacking cough that is worse at night. The cough may bring up yellow-green mucus. You may also feel short of breath or wheeze. Other symptoms may include tiredness, chest discomfort, and chills.  Bronchitis that is caused by a virus is not treated with antibiotics. Instead, medicines may be given to help relieve symptoms. Symptoms can last up to 2 weeks, although the cough may last much longer.  This illness is contagious during the first few days and is spread through the air by coughing and sneezing, or by direct contact (touching the sick person and then touching your own eyes, nose, or mouth).  Most viral illnesses resolve within 10 to 14 days with rest and simple home remedies, although they may sometimes last for several weeks.  Home care    If symptoms are severe, rest at home for the first 2 to 3 days. When you go back to your usual activities, don't let yourself get too tired.    Do not smoke. Also avoid being exposed to secondhand smoke.    You may use over-the-counter medicine to control fever or pain, unless another pain medicine was prescribed. If you have chronic liver or kidney disease or have ever had a stomach ulcer or gastrointestinal bleeding, talk with your healthcare provider before using these medicines.  Also talk to your provider if you are taking medicine to prevent blood clots. Aspirin should never be given to anyone younger than 18 years of age who is ill with a viral infection or fever. It may cause severe liver or brain damage.    Your appetite may be poor, so a light diet is fine. Avoid dehydration by drinking 6 to 8 glasses of fluids per day (such as water, soft drinks, sports drinks, juices, tea, or soup). Extra fluids will help loosen secretions in the nose and lungs.    Over-the-counter cough, cold, and sore-throat medicines will not shorten the length of the illness, but they may help to reduce symptoms. Don't use decongestants if you have high blood pressure.  Follow-up care  Follow up with your healthcare provider, or as advised. If you had an X-ray or ECG (electrocardiogram), a specialist will review it. You will be notified of any new findings that may affect your care.  If you are age 65 or older, or if you have a chronic lung disease or condition that affects your immune system, or you smoke, ask your healthcare provider about getting a pneumococcal vaccine and a yearly flu shot (influenza vaccine).  When to seek medical advice  Call your healthcare provider right away if any of these occur:    Fever of 100.4 F (38 C) or higher, or as directed by your healthcare provider    Coughing up increased amounts of colored sputum    Weakness, drowsiness, headache, facial pain, ear pain, or a stiff neck  Call 911  Call 911 if any of these occur:    Coughing up blood    Worsening weakness, drowsiness, headache, or stiff neck    Trouble breathing, wheezing, or pain with breathing  Date Last Reviewed: 9/13/2015 2000-2017 [employer]. [address] [place] [id number]. All rights reserved. This information is not intended as a substitute for professional medical care. Always follow your healthcare professional's instructions.

## 2022-08-22 ENCOUNTER — OFFICE VISIT (OUTPATIENT)
Dept: FAMILY MEDICINE | Facility: CLINIC | Age: 62
End: 2022-08-22
Payer: COMMERCIAL

## 2022-08-22 VITALS
WEIGHT: 188.8 LBS | HEIGHT: 68 IN | DIASTOLIC BLOOD PRESSURE: 68 MMHG | BODY MASS INDEX: 28.61 KG/M2 | HEART RATE: 58 BPM | SYSTOLIC BLOOD PRESSURE: 115 MMHG | RESPIRATION RATE: 16 BRPM

## 2022-08-22 DIAGNOSIS — R21 RASH AND NONSPECIFIC SKIN ERUPTION: ICD-10-CM

## 2022-08-22 DIAGNOSIS — H61.23 BILATERAL IMPACTED CERUMEN: ICD-10-CM

## 2022-08-22 DIAGNOSIS — G89.29 CHRONIC BILATERAL LOW BACK PAIN WITH LEFT-SIDED SCIATICA: ICD-10-CM

## 2022-08-22 DIAGNOSIS — M54.42 CHRONIC BILATERAL LOW BACK PAIN WITH LEFT-SIDED SCIATICA: ICD-10-CM

## 2022-08-22 DIAGNOSIS — Z78.9 HISTORY OF CLENCHING OF MANDIBLE: ICD-10-CM

## 2022-08-22 DIAGNOSIS — L98.9 SKIN LESION: Primary | ICD-10-CM

## 2022-08-22 PROBLEM — K63.5 POLYP OF COLON: Status: ACTIVE | Noted: 2021-10-04

## 2022-08-22 PROBLEM — K57.30 DIVERTICULAR DISEASE OF LARGE INTESTINE: Status: ACTIVE | Noted: 2021-10-04

## 2022-08-22 PROBLEM — K64.9 HEMORRHOIDS: Status: ACTIVE | Noted: 2021-10-04

## 2022-08-22 PROCEDURE — 99213 OFFICE O/P EST LOW 20 MIN: CPT | Mod: 25 | Performed by: FAMILY MEDICINE

## 2022-08-22 PROCEDURE — 69210 REMOVE IMPACTED EAR WAX UNI: CPT | Mod: 50 | Performed by: FAMILY MEDICINE

## 2022-08-22 NOTE — PROGRESS NOTES
"  Assessment & Plan     ICD-10-CM    1. Skin lesion  L98.9 Adult Dermatology Referral   2. Rash and nonspecific skin eruption  R21 Adult Dermatology Referral   3. History of clenching of mandible  Z78.9    4. Bilateral impacted cerumen  H61.23 REMOVE IMPACTED CERUMEN   5. Chronic bilateral low back pain with left-sided sciatica  M54.42     G89.29      Medication making: Patient has had some persistent lentiginous changes on his right upper chest.  Referral to dermatology for further evaluation and management.  He also has couple of black lesion on his knees, suspicious for psoriasis.  Patient has developed a history of clenching of the mandible, he thinks this is after he got COVID in 2021.  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  Exam today does not show any TMJ tenderness.  Continue to monitor.  If symptoms persist refer for ENT.  Patient verbalizes understanding.  He had a bug hit his right ear and felt some buzzing.  He wants to make sure that his your is a normal.  Exam shows bilateral impacted cerumen and lavage being done.    Patient has had some low back pain.  This has been ongoing but feels like in the last few weeks it has increased where he has aching.  This no numbness, no tingling or weakness.  He continues to exercise almost daily.  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  Exam today is normal.  Discussed physical therapy versus spine clinic evaluation.  He will follow-up if it is persistent or worsening.     BMI:   Estimated body mass index is 28.92 kg/m  as calculated from the following:    Height as of this encounter: 1.721 m (5' 7.75\").    Weight as of this encounter: 85.6 kg (188 lb 12.8 oz).       MEDICATIONS:  Continue current medications without change  See Patient Instructions    Return in about 3 months (around 11/22/2022) for Routine preventive.    Feliberto Gilman MD  [facility]   Braden is " "a 62 year old, presenting for the following health issues:  Possible bug in ear, Jaw pain since having Covid 1 year ago. , and Check mole on chest      History of Present Illness       Reason for visit:  Mole check, ear check, jaw issues    He eats 0-1 servings of fruits and vegetables daily.He consumes 1 sweetened beverage(s) daily.He exercises with enough effort to increase his heart rate 20 to 29 minutes per day.  He exercises with enough effort to increase his heart rate 4 days per week.   He is taking medications regularly.     Patient Active Problem List   Diagnosis     Joint Pain, Localized In The Knee     Tingling (Paresthesia)     Benign Prostatic Hypertrophy     Joint Pain, Localized In The Shoulder     Anxiety     Alopecia     Benign localized hyperplasia of prostate with urinary obstruction and other lower urinary tract symptoms (LUTS)(600.21)     Benign prostatic hyperplasia     Erectile dysfunction     Diverticular disease of large intestine     Hemorrhoids     Polyp of colon     Current Outpatient Medications   Medication     albuterol (PROAIR HFA;PROVENTIL HFA;VENTOLIN HFA) 90 mcg/actuation inhaler     b complex vitamins tablet     multivitamin capsule     triamcinolone (KENALOG) 0.025 % cream     No current facility-administered medications for this visit.       Review of Systems   Constitutional, HEENT, cardiovascular, pulmonary, gi and gu systems are negative, except as otherwise noted.      Objective    /68   Pulse 58   Resp 16   Ht 1.721 m (5' 7.75\")   Wt 85.6 kg (188 lb 12.8 oz)   BMI 28.92 kg/m    Body mass index is 28.92 kg/m .  Physical Exam   GENERAL: healthy, alert and no distress  HENT: normal cephalic/atraumatic, both ears: normal: no effusions, no erythema, normal landmarks, nose and mouth without ulcers or lesions, oropharynx clear and oral mucous membranes moist  MS: spine exam shows no scoliosis and ROM is normal.  Reflexes normal.  Noted some essential tremor.  Good " muscle strength.  SKIN: Noted continued skin changes on the right upper chest.  Noted small white plaque on the knee bilaterally with associated  dryness.                .  ..

## 2023-10-13 ENCOUNTER — TRANSFERRED RECORDS (OUTPATIENT)
Dept: HEALTH INFORMATION MANAGEMENT | Facility: CLINIC | Age: 63
End: 2023-10-13

## 2023-10-13 ENCOUNTER — OFFICE VISIT (OUTPATIENT)
Dept: FAMILY MEDICINE | Facility: CLINIC | Age: 63
End: 2023-10-13
Payer: COMMERCIAL

## 2023-10-13 VITALS
HEART RATE: 58 BPM | RESPIRATION RATE: 16 BRPM | WEIGHT: 188.6 LBS | OXYGEN SATURATION: 98 % | HEIGHT: 68 IN | SYSTOLIC BLOOD PRESSURE: 125 MMHG | TEMPERATURE: 98.4 F | DIASTOLIC BLOOD PRESSURE: 77 MMHG | BODY MASS INDEX: 28.58 KG/M2

## 2023-10-13 DIAGNOSIS — R19.8 CLENCHING OF TEETH: ICD-10-CM

## 2023-10-13 DIAGNOSIS — G89.29 CHRONIC RIGHT SHOULDER PAIN: Primary | ICD-10-CM

## 2023-10-13 DIAGNOSIS — Z98.890 S/P RIGHT ROTATOR CUFF REPAIR: ICD-10-CM

## 2023-10-13 DIAGNOSIS — R00.2 FLUTTERING SENSATION OF HEART: ICD-10-CM

## 2023-10-13 DIAGNOSIS — M25.511 CHRONIC RIGHT SHOULDER PAIN: Primary | ICD-10-CM

## 2023-10-13 LAB
ATRIAL RATE - MUSE: 54 BPM
BASO+EOS+MONOS # BLD AUTO: NORMAL 10*3/UL
BASO+EOS+MONOS NFR BLD AUTO: NORMAL %
BASOPHILS # BLD AUTO: 0 10E3/UL (ref 0–0.2)
BASOPHILS NFR BLD AUTO: 0 %
DIASTOLIC BLOOD PRESSURE - MUSE: NORMAL MMHG
EOSINOPHIL # BLD AUTO: 0.1 10E3/UL (ref 0–0.7)
EOSINOPHIL NFR BLD AUTO: 2 %
ERYTHROCYTE [DISTWIDTH] IN BLOOD BY AUTOMATED COUNT: 12.5 % (ref 10–15)
HCT VFR BLD AUTO: 44.7 % (ref 40–53)
HGB BLD-MCNC: 15 G/DL (ref 13.3–17.7)
IMM GRANULOCYTES # BLD: 0 10E3/UL
IMM GRANULOCYTES NFR BLD: 0 %
INTERPRETATION ECG - MUSE: NORMAL
LYMPHOCYTES # BLD AUTO: 1.5 10E3/UL (ref 0.8–5.3)
LYMPHOCYTES NFR BLD AUTO: 31 %
MCH RBC QN AUTO: 30.6 PG (ref 26.5–33)
MCHC RBC AUTO-ENTMCNC: 33.6 G/DL (ref 31.5–36.5)
MCV RBC AUTO: 91 FL (ref 78–100)
MONOCYTES # BLD AUTO: 0.6 10E3/UL (ref 0–1.3)
MONOCYTES NFR BLD AUTO: 12 %
NEUTROPHILS # BLD AUTO: 2.6 10E3/UL (ref 1.6–8.3)
NEUTROPHILS NFR BLD AUTO: 55 %
P AXIS - MUSE: 56 DEGREES
PLATELET # BLD AUTO: 224 10E3/UL (ref 150–450)
PR INTERVAL - MUSE: 222 MS
QRS DURATION - MUSE: 84 MS
QT - MUSE: 434 MS
QTC - MUSE: 411 MS
R AXIS - MUSE: 54 DEGREES
RBC # BLD AUTO: 4.9 10E6/UL (ref 4.4–5.9)
SYSTOLIC BLOOD PRESSURE - MUSE: NORMAL MMHG
T AXIS - MUSE: 52 DEGREES
VENTRICULAR RATE- MUSE: 54 BPM
WBC # BLD AUTO: 4.8 10E3/UL (ref 4–11)

## 2023-10-13 PROCEDURE — 36415 COLL VENOUS BLD VENIPUNCTURE: CPT | Performed by: FAMILY MEDICINE

## 2023-10-13 PROCEDURE — 85025 COMPLETE CBC W/AUTO DIFF WBC: CPT | Performed by: FAMILY MEDICINE

## 2023-10-13 PROCEDURE — 90480 ADMN SARSCOV2 VAC 1/ONLY CMP: CPT | Performed by: FAMILY MEDICINE

## 2023-10-13 PROCEDURE — 93010 ELECTROCARDIOGRAM REPORT: CPT | Performed by: INTERNAL MEDICINE

## 2023-10-13 PROCEDURE — 84443 ASSAY THYROID STIM HORMONE: CPT | Performed by: FAMILY MEDICINE

## 2023-10-13 PROCEDURE — 90471 IMMUNIZATION ADMIN: CPT | Performed by: FAMILY MEDICINE

## 2023-10-13 PROCEDURE — 93005 ELECTROCARDIOGRAM TRACING: CPT | Performed by: FAMILY MEDICINE

## 2023-10-13 PROCEDURE — 99214 OFFICE O/P EST MOD 30 MIN: CPT | Mod: 25 | Performed by: FAMILY MEDICINE

## 2023-10-13 PROCEDURE — 90682 RIV4 VACC RECOMBINANT DNA IM: CPT | Performed by: FAMILY MEDICINE

## 2023-10-13 PROCEDURE — 80053 COMPREHEN METABOLIC PANEL: CPT | Performed by: FAMILY MEDICINE

## 2023-10-13 PROCEDURE — 91320 SARSCV2 VAC 30MCG TRS-SUC IM: CPT | Performed by: FAMILY MEDICINE

## 2023-10-13 ASSESSMENT — ANXIETY QUESTIONNAIRES
3. WORRYING TOO MUCH ABOUT DIFFERENT THINGS: MORE THAN HALF THE DAYS
7. FEELING AFRAID AS IF SOMETHING AWFUL MIGHT HAPPEN: MORE THAN HALF THE DAYS
GAD7 TOTAL SCORE: 12
GAD7 TOTAL SCORE: 12
1. FEELING NERVOUS, ANXIOUS, OR ON EDGE: MORE THAN HALF THE DAYS
6. BECOMING EASILY ANNOYED OR IRRITABLE: NEARLY EVERY DAY
2. NOT BEING ABLE TO STOP OR CONTROL WORRYING: SEVERAL DAYS
5. BEING SO RESTLESS THAT IT IS HARD TO SIT STILL: SEVERAL DAYS
IF YOU CHECKED OFF ANY PROBLEMS ON THIS QUESTIONNAIRE, HOW DIFFICULT HAVE THESE PROBLEMS MADE IT FOR YOU TO DO YOUR WORK, TAKE CARE OF THINGS AT HOME, OR GET ALONG WITH OTHER PEOPLE: SOMEWHAT DIFFICULT

## 2023-10-13 ASSESSMENT — PATIENT HEALTH QUESTIONNAIRE - PHQ9: 5. POOR APPETITE OR OVEREATING: SEVERAL DAYS

## 2023-10-13 NOTE — PROGRESS NOTES
Assessment & Plan     ICD-10-CM    1. Chronic right shoulder pain  M25.511 Orthopedic  Referral    G89.29       2. S/P right rotator cuff repair  Z98.890 Orthopedic  Referral      3. Fluttering sensation of heart  R00.2 EKG 12-lead, tracing only     Adult Cardiac Event Monitor     TSH with free T4 reflex     CBC with platelets and differential     Comprehensive metabolic panel (BMP + Alb, Alk Phos, ALT, AST, Total. Bili, TP)     TSH with free T4 reflex     CBC with platelets and differential     Comprehensive metabolic panel (BMP + Alb, Alk Phos, ALT, AST, Total. Bili, TP)      4. Clenching of teeth  R19.8 Adult ENT  Referral        Patient is today for following concerns which were addressed  1: Chronic right shoulder pain.  He does play baseball but usually after the season is over, his right shoulder pain goes away.  This time it has persisted.  Currently he is taking a break.  He does have a history of rotator cuff repair.  Discussed about doing imaging versus referral to a specialist for further evaluation and management and he does opt for the latter.  He was previously followed at [facility] and a referral is placed.  2: Fluttering sensation: Over the last couple of months he has had 3 of 4 episode of sudden palpitations.  On detailed exploration, he does have an upcoming shelter but does not feel that he is unduly anxious as this can happen when he is sitting at dinner.  We will start with baseline evaluation.  An EKG shows sinus bradycardia with first-degree block but otherwise no concerns.  We will also proceed with cardiac event monitor to capture these events for further management.  3: Clenching of teeth and TMJ discomfort.  This has been ongoing.  Patient does wear dental guard at nighttime.  His clenching of teeth is out of proportion.  This is even during daytime and to prevent clenching he continues to chew on gum  I will refer to ENT if clenching of teeth and  "severe TMJ needs further evaluation or if it can can be treated with any cortisone injections.  Meanwhile I have also asked him to follow-up with a dentist as he does have misaligned teeth and has not recently followed with a dentist..     BMI:   Estimated body mass index is 28.89 kg/m  as calculated from the following:    Height as of this encounter: 1.721 m (5' 7.75\").    Weight as of this encounter: 85.5 kg (188 lb 9.6 oz).       MEDICATIONS:  Continue current medications without change  See Patient Instructions    Feliberto Gilman MD  [facility]    Elvin Feliciano is a 63 year old, presenting for the following health issues:  Covid Concern (Heart flutters that last about minute./) and Shoulder Pain (Right Shoulder pain, advil helps the pain)        10/13/2023     9:29 AM   Additional Questions   Roomed by Porsche HUITRON CMA       History of Present Illness       Hypertension: He presents for follow up of hypertension.  He does not check blood pressure  regularly outside of the clinic. Outside blood pressures have been over 140/90. He follows a low salt diet.     Vascular Disease:  He presents for follow up of vascular disease.     He never takes nitroglycerin. He is not taking daily aspirin.    Reason for visit:  Continuing TMJ Issues ,heart flutter possible,shoulder pain in tissue and joint    He eats 0-1 servings of fruits and vegetables daily.He consumes 2 sweetened beverage(s) daily.He exercises with enough effort to increase his heart rate 10 to 19 minutes per day.  He exercises with enough effort to increase his heart rate 3 or less days per week.   He is taking medications regularly.     Patient Active Problem List   Diagnosis    Joint Pain, Localized In The Knee    Tingling (Paresthesia)    Benign Prostatic Hypertrophy    Joint Pain, Localized In The Shoulder    Anxiety    Alopecia    Benign localized hyperplasia of prostate with urinary obstruction and other lower urinary " "tract symptoms (LUTS)(600.21)    Benign prostatic hyperplasia    Erectile dysfunction    Diverticular disease of large intestine    Hemorrhoids    Polyp of colon     Current Outpatient Medications   Medication    b complex vitamins tablet    multivitamin capsule    triamcinolone (KENALOG) 0.025 % cream     No current facility-administered medications for this visit.         Review of Systems   Constitutional, HEENT, cardiovascular, pulmonary, gi and gu systems are negative, except as otherwise noted.      Objective    /77 (BP Location: Left arm, Patient Position: Sitting, Cuff Size: Adult Regular)   Pulse 58   Temp 98.4  F (36.9  C) (Oral)   Resp 16   Ht 1.721 m (5' 7.75\")   Wt 85.5 kg (188 lb 9.6 oz)   SpO2 98%   BMI 28.89 kg/m    Body mass index is 28.89 kg/m .  Physical Exam   GENERAL: healthy, alert and no distress  EYES: Eyes grossly normal to inspection, PERRL and conjunctivae and sclerae normal  HENT: ear canals and TM's normal, nose and mouth without ulcers or lesions  NECK: no adenopathy, no asymmetry, masses, or scars and thyroid normal to palpation  RESP: lungs clear to auscultation - no rales, rhonchi or wheezes  CV: regular rate and rhythm, normal S1 S2, no S3 or S4, no murmur, click or rub, no peripheral edema and peripheral pulses strong  ABDOMEN: soft, nontender, no hepatosplenomegaly, no masses and bowel sounds normal  MS: no gross musculoskeletal defects noted, no edema    Results for orders placed or performed in visit on 10/13/23 (from the past 24 hour(s))   EKG 12-lead, tracing only   Result Value Ref Range    Systolic Blood Pressure  mmHg    Diastolic Blood Pressure  mmHg    Ventricular Rate 54 BPM    Atrial Rate 54 BPM    NM Interval 222 ms    QRS Duration 84 ms     ms    QTc 411 ms    P Axis 56 degrees    R AXIS 54 degrees    T Axis 52 degrees    Interpretation ECG       Sinus bradycardia with 1st degree A-V block  Otherwise normal ECG  When compared with ECG of 28-MAY-2009 " 07:55,  No significant change was found     CBC with platelets and differential    Narrative    The following orders were created for panel order CBC with platelets and differential.  Procedure                               Abnormality         Status                     ---------                               -----------         ------                     CBC with platelets and d...[[id number]]                      Final result                 Please view results for these tests on the individual orders.   CBC with platelets and differential   Result Value Ref Range    WBC Count 4.8 4.0 - 11.0 10e3/uL    RBC Count 4.90 4.40 - 5.90 10e6/uL    Hemoglobin 15.0 13.3 - 17.7 g/dL    Hematocrit 44.7 40.0 - 53.0 %    MCV 91 78 - 100 fL    MCH 30.6 26.5 - 33.0 pg    MCHC 33.6 31.5 - 36.5 g/dL    RDW 12.5 10.0 - 15.0 %    Platelet Count 224 150 - 450 10e3/uL    % Neutrophils 55 %    % Lymphocytes 31 %    % Monocytes 12 %    Mids % (Monos, Eos, Basos)      % Eosinophils 2 %    % Basophils 0 %    % Immature Granulocytes 0 %    Absolute Neutrophils 2.6 1.6 - 8.3 10e3/uL    Absolute Lymphocytes 1.5 0.8 - 5.3 10e3/uL    Absolute Monocytes 0.6 0.0 - 1.3 10e3/uL    Mids Abs (Monos, Eos, Basos)      Absolute Eosinophils 0.1 0.0 - 0.7 10e3/uL    Absolute Basophils 0.0 0.0 - 0.2 10e3/uL    Absolute Immature Granulocytes 0.0 <=0.4 10e3/uL

## 2023-10-14 LAB
ALBUMIN SERPL BCG-MCNC: 4.4 G/DL (ref 3.5–5.2)
ALP SERPL-CCNC: 70 U/L (ref 40–129)
ALT SERPL W P-5'-P-CCNC: 27 U/L (ref 0–70)
ANION GAP SERPL CALCULATED.3IONS-SCNC: 7 MMOL/L (ref 7–15)
AST SERPL W P-5'-P-CCNC: 25 U/L (ref 0–45)
BILIRUB SERPL-MCNC: 0.6 MG/DL
BUN SERPL-MCNC: 15.2 MG/DL (ref 8–23)
CALCIUM SERPL-MCNC: 9.6 MG/DL (ref 8.8–10.2)
CHLORIDE SERPL-SCNC: 105 MMOL/L (ref 98–107)
CREAT SERPL-MCNC: 0.87 MG/DL (ref 0.67–1.17)
DEPRECATED HCO3 PLAS-SCNC: 27 MMOL/L (ref 22–29)
EGFRCR SERPLBLD CKD-EPI 2021: >90 ML/MIN/1.73M2
GLUCOSE SERPL-MCNC: 89 MG/DL (ref 70–99)
POTASSIUM SERPL-SCNC: 4.7 MMOL/L (ref 3.4–5.3)
PROT SERPL-MCNC: 6.9 G/DL (ref 6.4–8.3)
SODIUM SERPL-SCNC: 139 MMOL/L (ref 135–145)
TSH SERPL DL<=0.005 MIU/L-ACNC: 1.11 UIU/ML (ref 0.3–4.2)

## 2023-10-17 ENCOUNTER — TELEPHONE (OUTPATIENT)
Dept: FAMILY MEDICINE | Facility: CLINIC | Age: 63
End: 2023-10-17
Payer: COMMERCIAL

## 2023-10-17 NOTE — TELEPHONE ENCOUNTER
----- Message from Feliberto Gilman MD sent at 10/16/2023  7:12 AM CDT -----  Please mail letter with normal results    Feliberto Gilman MD

## 2023-10-17 NOTE — LETTER
October 17, 2023      Braden Aleman  [address] [facility] [address]        Dear ,    We are writing to inform you of your test results.    Your test results fall within the expected range(s) or remain unchanged from previous results.  Please continue with current treatment plan.    Resulted Orders   TSH with free T4 reflex   Result Value Ref Range    TSH 1.11 0.30 - 4.20 uIU/mL   Comprehensive metabolic panel (BMP + Alb, Alk Phos, ALT, AST, Total. Bili, TP)   Result Value Ref Range    Sodium 139 135 - 145 mmol/L      Comment:      Reference intervals for this test were updated on 09/26/2023 to more accurately reflect our healthy population. There may be differences in the flagging of prior results with similar values performed with this method. Interpretation of those prior results can be made in the context of the updated reference intervals.     Potassium 4.7 3.4 - 5.3 mmol/L    Carbon Dioxide (CO2) 27 22 - 29 mmol/L    Anion Gap 7 7 - 15 mmol/L    Urea Nitrogen 15.2 8.0 - 23.0 mg/dL    Creatinine 0.87 0.67 - 1.17 mg/dL    GFR Estimate >90 >60 mL/min/1.73m2    Calcium 9.6 8.8 - 10.2 mg/dL    Chloride 105 98 - 107 mmol/L    Glucose 89 70 - 99 mg/dL    Alkaline Phosphatase 70 40 - 129 U/L    AST 25 0 - 45 U/L      Comment:      Reference intervals for this test were updated on 6/12/2023 to more accurately reflect our healthy population. There may be differences in the flagging of prior results with similar values performed with this method. Interpretation of those prior results can be made in the context of the updated reference intervals.    ALT 27 0 - 70 U/L      Comment:      Reference intervals for this test were updated on 6/12/2023 to more accurately reflect our healthy population. There may be differences in the flagging of prior results with similar values performed with this method. Interpretation of those prior results can be made in the context of the updated reference  intervals.      Protein Total 6.9 6.4 - 8.3 g/dL    Albumin 4.4 3.5 - 5.2 g/dL    Bilirubin Total 0.6 <=1.2 mg/dL   CBC with platelets and differential   Result Value Ref Range    WBC Count 4.8 4.0 - 11.0 10e3/uL    RBC Count 4.90 4.40 - 5.90 10e6/uL    Hemoglobin 15.0 13.3 - 17.7 g/dL    Hematocrit 44.7 40.0 - 53.0 %    MCV 91 78 - 100 fL    MCH 30.6 26.5 - 33.0 pg    MCHC 33.6 31.5 - 36.5 g/dL    RDW 12.5 10.0 - 15.0 %    Platelet Count 224 150 - 450 10e3/uL    % Neutrophils 55 %    % Lymphocytes 31 %    % Monocytes 12 %    Mids % (Monos, Eos, Basos)      % Eosinophils 2 %    % Basophils 0 %    % Immature Granulocytes 0 %    Absolute Neutrophils 2.6 1.6 - 8.3 10e3/uL    Absolute Lymphocytes 1.5 0.8 - 5.3 10e3/uL    Absolute Monocytes 0.6 0.0 - 1.3 10e3/uL    Mids Abs (Monos, Eos, Basos)      Absolute Eosinophils 0.1 0.0 - 0.7 10e3/uL    Absolute Basophils 0.0 0.0 - 0.2 10e3/uL    Absolute Immature Granulocytes 0.0 <=0.4 10e3/uL       If you have any questions or concerns, please call the clinic at the number listed above.       Sincerely,

## 2023-12-14 ENCOUNTER — TRANSFERRED RECORDS (OUTPATIENT)
Dept: HEALTH INFORMATION MANAGEMENT | Facility: CLINIC | Age: 63
End: 2023-12-14
Payer: COMMERCIAL

## 2024-01-29 ENCOUNTER — OFFICE VISIT (OUTPATIENT)
Dept: FAMILY MEDICINE | Facility: CLINIC | Age: 64
End: 2024-01-29
Payer: COMMERCIAL

## 2024-01-29 VITALS
RESPIRATION RATE: 16 BRPM | HEART RATE: 71 BPM | OXYGEN SATURATION: 97 % | SYSTOLIC BLOOD PRESSURE: 115 MMHG | DIASTOLIC BLOOD PRESSURE: 74 MMHG | BODY MASS INDEX: 28.01 KG/M2 | WEIGHT: 184.8 LBS | HEIGHT: 68 IN

## 2024-01-29 DIAGNOSIS — Z01.818 PREOP GENERAL PHYSICAL EXAM: Primary | ICD-10-CM

## 2024-01-29 PROCEDURE — 99214 OFFICE O/P EST MOD 30 MIN: CPT | Performed by: FAMILY MEDICINE

## 2024-01-29 RX ORDER — RIBOFLAVIN (VITAMIN B2) 100 MG
2 TABLET ORAL DAILY
COMMUNITY
Start: 2024-01-01

## 2024-01-29 NOTE — PROGRESS NOTES
Preoperative Evaluation  [facility]  [address] [facility] [address]  Phone: [phone]  Fax: [phone]  Primary Provider: Eloy Gilman  Pre-op Performing Provider: ELOY GILMAN  Jan 29, 2024       Braden is a 63 year old, presenting for the following:  Pre-Op Exam (DOS 02/07/2024, right shoulder surgery, @ [facility] in [place], unknown  )        1/29/2024     1:42 PM   Additional Questions   Roomed by Damari Pete CMA     Surgical Information  Surgery/Procedure: Right shoulder   Surgery Location: [facility]   Surgeon: Unknown   Surgery Date: Possibly 02/07/2024 (pt unsure the exact date)  Time of Surgery: n/a  Where patient plans to recover: At home with family  Fax number for surgical facility: [phone]    Assessment & Plan     ICD-10-CM    1. Preop general physical exam  Z01.818           The proposed surgical procedure is considered INTERMEDIATE risk.    Possible Sleep Apnea: He was given his CPAP about 10 years ago.  Finds it very uncomfortable and has not been using it.  Uses a dental guard.  Advised to follow-up for repeat sleep study.  Patient examinable and will pursue now after surgery.  {        - No identified additional risk factors other than previously addressed    Antiplatelet or Anticoagulation Medication Instructions   - Patient is on no antiplatelet or anticoagulation medications.    Additional Medication Instructions  Patient is on no additional chronic medications    Recommendation  APPROVAL GIVEN to proceed with proposed procedure, without further diagnostic evaluation.    Review of prior external note(s) from [facility]      Subjective   Chief Complaint   Patient presents with     Pre-Op Exam     DOS 02/07/2024, right shoulder surgery, @ [facility] in [place], unknown       HPI related to upcoming procedure: Undergoing arthroscopic repair for possible labrum tear.         1/28/2024     4:19 PM   Preop Questions   1. Have you ever had a heart attack or stroke? No   2. Have you ever had surgery on your heart or blood vessels, such as a stent placement, a coronary artery bypass, or surgery on an artery in your head, neck, heart, or legs? No   3. Do you have chest pain with activity? No   4. Do you have a history of  heart failure? No   5. Do you currently have a cold, bronchitis or symptoms of other infection? No   6. Do you have a cough, shortness of breath, or wheezing? No   7. Do you or anyone in your family have previous history of blood clots? No   8. Do you or does anyone in your family have a serious bleeding problem such as prolonged bleeding following surgeries or cuts? No   9. Have you ever had problems with anemia or been told to take iron pills? No   10. Have you had any abnormal blood loss such as black, tarry or bloody stools? No   11. Have you ever had a blood transfusion? No   12. Are you willing to have a blood transfusion if it is medically needed before, during, or after your surgery? Yes   13. Have you or any of your relatives ever had problems with anesthesia? No   14. Do you have sleep apnea, excessive snoring or daytime drowsiness? YES - Wears mouthguard, Has CPAP that he does not tolerate   14a. Do you have a CPAP machine? No   15. Do you have any artifical heart valves or other implanted medical devices like a pacemaker, defibrillator, or continuous glucose monitor? No   16. Do you have artificial joints? No   17. Are you allergic to latex? No       Health Care Directive  Patient does not have a Health Care Directive or Living Will: Discussed advance care planning with patient; information given to patient to review.    Preoperative Review of    reviewed - no record of controlled substances prescribed.      Status of Chronic Conditions:  See problem list for active medical problems.  Problems all longstanding and stable, except as noted/documented.  See  ROS for pertinent symptoms related to these conditions.    Patient Active Problem List    Diagnosis Date Noted     Diverticular disease of large intestine 10/04/2021     Priority: Medium     Hemorrhoids 10/04/2021     Priority: Medium     Polyp of colon 10/04/2021     Priority: Medium     Erectile dysfunction 04/24/2017     Priority: Medium     Anxiety      Priority: Medium     Created by Conversion  Replacement Utility updated for latest IMO load         Benign localized hyperplasia of prostate with urinary obstruction and other lower urinary tract symptoms (LUTS)(600.21) 11/13/2014     Priority: Medium     Benign prostatic hyperplasia 11/13/2014     Priority: Medium     Benign Prostatic Hypertrophy      Priority: Medium     Created by Conversion         Joint Pain, Localized In The Knee      Priority: Medium     Created by Conversion         Tingling (Paresthesia)      Priority: Medium     Created by Conversion         Joint Pain, Localized In The Shoulder      Priority: Medium     Created by Conversion          Past Medical History:   Diagnosis Date     Kidney stones      Past Surgical History:   Procedure Laterality Date     ARTHROSCOPY SHOULDER ROTATOR CUFF REPAIR Bilateral      COLONOSCOPY  2010     EYE SURGERY  1985    Over 30 years ago     Current Outpatient Medications   Medication Sig Dispense Refill     b complex vitamins tablet [B COMPLEX VITAMINS TABLET] Take 1 tablet by mouth daily.       glucosamine-chondroitin (COSAMIN DS) 500-400 MG tablet        multivitamin capsule [MULTIVITAMIN CAPSULE] Take 1 capsule by mouth daily.         No Known Allergies     Social History     Tobacco Use     Smoking status: Never     Passive exposure: Yes     Smokeless tobacco: Never     Tobacco comments:     spouse outside   Substance Use Topics     Alcohol use: Yes     Comment: Alcoholic Drinks/day: 2-4 drinks per week ( a Cooler)     Family History   Problem Relation Age of Onset     Breast Cancer Mother      Multiple  "Sclerosis Mother      Depression Sister      Multiple Sclerosis Sister      No Known Problems Father      History   Drug Use No         Review of Systems    Review of Systems  Constitutional, HEENT, cardiovascular, pulmonary, GI, , musculoskeletal, neuro, skin, endocrine and psych systems are negative, except as otherwise noted.  Objective    /74 (BP Location: Left arm, Patient Position: Sitting, Cuff Size: Adult Regular)   Pulse 71   Resp 16   Ht 1.715 m (5' 7.5\")   Wt 83.8 kg (184 lb 12.8 oz)   SpO2 97%   BMI 28.52 kg/m     Estimated body mass index is 28.52 kg/m  as calculated from the following:    Height as of this encounter: 1.715 m (5' 7.5\").    Weight as of this encounter: 83.8 kg (184 lb 12.8 oz).  Physical Exam  GENERAL: alert and no distress  NECK: no adenopathy, no asymmetry, masses, or scars  RESP: lungs clear to auscultation - no rales, rhonchi or wheezes  CV: regular rate and rhythm, normal S1 S2, no S3 or S4, no murmur, click or rub, no peripheral edema  ABDOMEN: soft, nontender, no hepatosplenomegaly, no masses and bowel sounds normal  MS: no gross musculoskeletal defects noted, no edema    Recent Labs   Lab Test 10/13/23  1051   HGB 15.0         POTASSIUM 4.7   CR 0.87        Diagnostics  No labs were ordered during this visit.   No EKG this visit, completed in the last 90 days.    Revised Cardiac Risk Index (RCRI)  The patient has the following serious cardiovascular risks for perioperative complications:   - No serious cardiac risks = 0 points     RCRI Interpretation: 0 points: Class I (very low risk - 0.4% complication rate)         Signed Electronically by: Feliberto Gilman MD  Copy of this evaluation report is provided to requesting physician.         "

## 2024-01-29 NOTE — PATIENT INSTRUCTIONS
Preparing for Your Surgery  Getting started  A nurse will call you to review your health history and instructions. They will give you an arrival time based on your scheduled surgery time. Please be ready to share:  Your doctor's clinic name and phone number  Your medical, surgical, and anesthesia history  A list of allergies and sensitivities  A list of medicines, including herbal treatments and over-the-counter drugs  Whether the patient has a legal guardian (ask how to send us the papers in advance)  Please tell us if you're pregnant--or if there's any chance you might be pregnant. Some surgeries may injure a fetus (unborn baby), so they require a pregnancy test. Surgeries that are safe for a fetus don't always need a test, and you can choose whether to have one.   If you have a child who's having surgery, please ask for a copy of Preparing for Your Child's Surgery.    Preparing for surgery  Within 10 to 30 days of surgery: Have a pre-op exam (sometimes called an H&P, or History and Physical). This can be done at a clinic or pre-operative center.  If you're having a , you may not need this exam. Talk to your care team.  At your pre-op exam, talk to your care team about all medicines you take. If you need to stop any medicines before surgery, ask when to start taking them again.  We do this for your safety. Many medicines can make you bleed too much during surgery. Some change how well surgery (anesthesia) drugs work.  Call your insurance company to let them know you're having surgery. (If you don't have insurance, call [phone].)  Call your clinic if there's any change in your health. This includes signs of a cold or flu (sore throat, runny nose, cough, rash, fever). It also includes a scrape or scratch near the surgery site.  If you have questions on the day of surgery, call your hospital or surgery center.  Eating and drinking guidelines  For your safety: Unless your surgeon tells you otherwise,  follow the guidelines below.  Eat and drink as usual until 8 hours before you arrive for surgery. After that, no food or milk.  Drink clear liquids until 2 hours before you arrive. These are liquids you can see through, like water, Gatorade, and Propel Water. They also include plain black coffee and tea (no cream or milk), candy, and breath mints. You can spit out gum when you arrive.  If you drink alcohol: Stop drinking it the night before surgery.  If your care team tells you to take medicine on the morning of surgery, it's okay to take it with a sip of water.  Preventing infection  Shower or bathe the night before and morning of your surgery. Follow the instructions your clinic gave you. (If no instructions, use regular soap.)  Don't shave or clip hair near your surgery site. We'll remove the hair if needed.  Don't smoke or vape the morning of surgery. You may chew nicotine gum up to 2 hours before surgery. A nicotine patch is okay.  Note: Some surgeries require you to completely quit smoking and nicotine. Check with your surgeon.  Your care team will make every effort to keep you safe from infection. We will:  Clean our hands often with soap and water (or an alcohol-based hand rub).  Clean the skin at your surgery site with a special soap that kills germs.  Give you a special gown to keep you warm. (Cold raises the risk of infection.)  Wear special hair covers, masks, gowns and gloves during surgery.  Give antibiotic medicine, if prescribed. Not all surgeries need antibiotics.  What to bring on the day of surgery  Photo ID and insurance card  Copy of your health care directive, if you have one  Glasses and hearing aids (bring cases)  You can't wear contacts during surgery  Inhaler and eye drops, if you use them (tell us about these when you arrive)  CPAP machine or breathing device, if you use them  A few personal items, if spending the night  If you have . . .  A pacemaker, ICD (cardiac defibrillator) or other  implant: Bring the ID card.  An implanted stimulator: Bring the remote control.  A legal guardian: Bring a copy of the certified (court-stamped) guardianship papers.  Please remove any jewelry, including body piercings. Leave jewelry and other valuables at home.  If you're going home the day of surgery  You must have a responsible adult drive you home. They should stay with you overnight as well.  If you don't have someone to stay with you, and you aren't safe to go home alone, we may keep you overnight. Insurance often won't pay for this.  After surgery  If it's hard to control your pain or you need more pain medicine, please call your surgeon's office.  Questions?   If you have any questions for your care team, list them here: _________________________________________________________________________________________________________________________________________________________________________ ____________________________________ ____________________________________ ____________________________________  For informational purposes only. Not to replace the advice of your health care provider. Copyright   2003, 2019 [employer] [facility] [place]. All rights reserved. Clinically reviewed by Zuleika Richards MD. [employer] 216638 - REV 12/22.    How to Take Your Medication Before Surgery  - Take all of your medications before surgery except as noted below  - STOP taking all vitamins and herbal supplements 14 days before surgery.

## 2024-02-07 ENCOUNTER — TRANSFERRED RECORDS (OUTPATIENT)
Dept: HEALTH INFORMATION MANAGEMENT | Facility: CLINIC | Age: 64
End: 2024-02-07
Payer: COMMERCIAL

## 2024-02-21 ENCOUNTER — TRANSFERRED RECORDS (OUTPATIENT)
Dept: HEALTH INFORMATION MANAGEMENT | Facility: CLINIC | Age: 64
End: 2024-02-21
Payer: COMMERCIAL

## 2024-03-20 ENCOUNTER — TRANSFERRED RECORDS (OUTPATIENT)
Dept: HEALTH INFORMATION MANAGEMENT | Facility: CLINIC | Age: 64
End: 2024-03-20
Payer: COMMERCIAL

## 2024-05-01 ENCOUNTER — TRANSFERRED RECORDS (OUTPATIENT)
Dept: HEALTH INFORMATION MANAGEMENT | Facility: CLINIC | Age: 64
End: 2024-05-01
Payer: COMMERCIAL

## 2024-07-28 SDOH — HEALTH STABILITY: PHYSICAL HEALTH: ON AVERAGE, HOW MANY DAYS PER WEEK DO YOU ENGAGE IN MODERATE TO STRENUOUS EXERCISE (LIKE A BRISK WALK)?: 4 DAYS

## 2024-07-28 SDOH — HEALTH STABILITY: PHYSICAL HEALTH: ON AVERAGE, HOW MANY MINUTES DO YOU ENGAGE IN EXERCISE AT THIS LEVEL?: 60 MIN

## 2024-07-28 ASSESSMENT — SOCIAL DETERMINANTS OF HEALTH (SDOH): HOW OFTEN DO YOU GET TOGETHER WITH FRIENDS OR RELATIVES?: ONCE A WEEK

## 2024-07-29 ENCOUNTER — OFFICE VISIT (OUTPATIENT)
Dept: FAMILY MEDICINE | Facility: CLINIC | Age: 64
End: 2024-07-29
Payer: COMMERCIAL

## 2024-07-29 VITALS
WEIGHT: 189.4 LBS | HEIGHT: 68 IN | RESPIRATION RATE: 16 BRPM | BODY MASS INDEX: 28.7 KG/M2 | OXYGEN SATURATION: 97 % | DIASTOLIC BLOOD PRESSURE: 79 MMHG | SYSTOLIC BLOOD PRESSURE: 118 MMHG | HEART RATE: 61 BPM

## 2024-07-29 DIAGNOSIS — R07.89 SENSATION OF CHEST PRESSURE: ICD-10-CM

## 2024-07-29 DIAGNOSIS — Z00.00 ROUTINE GENERAL MEDICAL EXAMINATION AT A HEALTH CARE FACILITY: Primary | ICD-10-CM

## 2024-07-29 DIAGNOSIS — H93.12 TINNITUS, LEFT: ICD-10-CM

## 2024-07-29 DIAGNOSIS — Z12.5 SCREENING FOR PROSTATE CANCER: ICD-10-CM

## 2024-07-29 DIAGNOSIS — M26.602 LEFT TEMPOROMANDIBULAR JOINT DISORDER, UNSPECIFIED: ICD-10-CM

## 2024-07-29 DIAGNOSIS — H91.90 DECREASED HEARING, UNSPECIFIED LATERALITY: ICD-10-CM

## 2024-07-29 PROBLEM — K57.30 DIVERTICULAR DISEASE OF LARGE INTESTINE: Status: RESOLVED | Noted: 2021-10-04 | Resolved: 2024-07-29

## 2024-07-29 LAB
ALBUMIN SERPL BCG-MCNC: 4.2 G/DL (ref 3.5–5.2)
ALP SERPL-CCNC: 71 U/L (ref 40–150)
ALT SERPL W P-5'-P-CCNC: 23 U/L (ref 0–70)
ANION GAP SERPL CALCULATED.3IONS-SCNC: 8 MMOL/L (ref 7–15)
AST SERPL W P-5'-P-CCNC: 26 U/L (ref 0–45)
BASOPHILS # BLD AUTO: 0 10E3/UL (ref 0–0.2)
BASOPHILS NFR BLD AUTO: 1 %
BILIRUB SERPL-MCNC: 0.5 MG/DL
BUN SERPL-MCNC: 15.6 MG/DL (ref 8–23)
CALCIUM SERPL-MCNC: 9.3 MG/DL (ref 8.8–10.4)
CHLORIDE SERPL-SCNC: 106 MMOL/L (ref 98–107)
CHOLEST SERPL-MCNC: 217 MG/DL
CREAT SERPL-MCNC: 0.84 MG/DL (ref 0.67–1.17)
EGFRCR SERPLBLD CKD-EPI 2021: >90 ML/MIN/1.73M2
EOSINOPHIL # BLD AUTO: 0.1 10E3/UL (ref 0–0.7)
EOSINOPHIL NFR BLD AUTO: 3 %
ERYTHROCYTE [DISTWIDTH] IN BLOOD BY AUTOMATED COUNT: 13 % (ref 10–15)
FASTING STATUS PATIENT QL REPORTED: YES
FASTING STATUS PATIENT QL REPORTED: YES
GLUCOSE SERPL-MCNC: 93 MG/DL (ref 70–99)
HCO3 SERPL-SCNC: 26 MMOL/L (ref 22–29)
HCT VFR BLD AUTO: 44.3 % (ref 40–53)
HDLC SERPL-MCNC: 70 MG/DL
HGB BLD-MCNC: 15 G/DL (ref 13.3–17.7)
IMM GRANULOCYTES # BLD: 0 10E3/UL
IMM GRANULOCYTES NFR BLD: 0 %
LDLC SERPL CALC-MCNC: 137 MG/DL
LYMPHOCYTES # BLD AUTO: 1.4 10E3/UL (ref 0.8–5.3)
LYMPHOCYTES NFR BLD AUTO: 39 %
MCH RBC QN AUTO: 30.3 PG (ref 26.5–33)
MCHC RBC AUTO-ENTMCNC: 33.9 G/DL (ref 31.5–36.5)
MCV RBC AUTO: 90 FL (ref 78–100)
MONOCYTES # BLD AUTO: 0.4 10E3/UL (ref 0–1.3)
MONOCYTES NFR BLD AUTO: 11 %
NEUTROPHILS # BLD AUTO: 1.6 10E3/UL (ref 1.6–8.3)
NEUTROPHILS NFR BLD AUTO: 45 %
NONHDLC SERPL-MCNC: 147 MG/DL
PLATELET # BLD AUTO: 223 10E3/UL (ref 150–450)
POTASSIUM SERPL-SCNC: 5 MMOL/L (ref 3.4–5.3)
PROT SERPL-MCNC: 6.8 G/DL (ref 6.4–8.3)
PSA SERPL DL<=0.01 NG/ML-MCNC: 0.83 NG/ML (ref 0–4.5)
RBC # BLD AUTO: 4.95 10E6/UL (ref 4.4–5.9)
SODIUM SERPL-SCNC: 140 MMOL/L (ref 135–145)
TRIGL SERPL-MCNC: 52 MG/DL
WBC # BLD AUTO: 3.5 10E3/UL (ref 4–11)

## 2024-07-29 PROCEDURE — 80061 LIPID PANEL: CPT | Performed by: FAMILY MEDICINE

## 2024-07-29 PROCEDURE — 85025 COMPLETE CBC W/AUTO DIFF WBC: CPT | Performed by: FAMILY MEDICINE

## 2024-07-29 PROCEDURE — G0103 PSA SCREENING: HCPCS | Performed by: FAMILY MEDICINE

## 2024-07-29 PROCEDURE — 36415 COLL VENOUS BLD VENIPUNCTURE: CPT | Performed by: FAMILY MEDICINE

## 2024-07-29 PROCEDURE — 99396 PREV VISIT EST AGE 40-64: CPT | Performed by: FAMILY MEDICINE

## 2024-07-29 PROCEDURE — 80053 COMPREHEN METABOLIC PANEL: CPT | Performed by: FAMILY MEDICINE

## 2024-07-29 PROCEDURE — 99213 OFFICE O/P EST LOW 20 MIN: CPT | Mod: 25 | Performed by: FAMILY MEDICINE

## 2024-07-29 NOTE — PROGRESS NOTES
"Preventive Care Visit  [facility]  Feliberto Gilman MD, Family Medicine  Jul 29, 2024      Assessment & Plan     ICD-10-CM    1. Routine general medical examination at a health care facility  Z00.00 Comprehensive metabolic panel (BMP + Alb, Alk Phos, ALT, AST, Total. Bili, TP)     Lipid panel     CBC with platelets and differential      2. Sensation of chest pressure  R07.89 NM Lexiscan stress test      3. Left temporomandibular joint disorder, unspecified  M26.602 Adult ENT  Referral      4. Tinnitus, left  H93.12       5. Decreased hearing, unspecified laterality  H91.90 Adult Audiology  Referral      6. Screening for prostate cancer  Z12.5 PSA, screen        Patient presents today for annual physical exam.  Following issues addressed  1: Sensation of chest pressure.  This has been intermittent.  While he was able to go for 5 mile walk in [place], recently with lawnmowing he has felt some chest pressure.  He does not have any underlying risk factors of diabetes, hyperlipidemia or hypertension.  However we will proceed with a stress test as in the past he has also had similar symptoms.  There is a history of passive smoke exposure.  2: He has had discomfort in his left TMJ and feels a whooshing sound of his left ear.  Refer to ENT.  Failed hearing test today.  Refer to audiology.  Healthcare maintenance labs as above.      BMI  Estimated body mass index is 28.8 kg/m  as calculated from the following:    Height as of this encounter: 1.727 m (5' 8\").    Weight as of this encounter: 85.9 kg (189 lb 6.4 oz).       Counseling  Appropriate preventive services were addressed with this patient via screening, questionnaire, or discussion as appropriate for fall prevention, nutrition, physical activity, Tobacco-use cessation, weight loss and cognition.  Checklist reviewing preventive services available has been given to the patient.  Reviewed patient's diet, addressing " concerns and/or questions.   The patient was instructed to see the dentist every 6 months.       MEDICATIONS:  Continue current medications without change    Subjective   Braden is a 63 year old, presenting for the following:  Physical (Pt is fasting today, left hear TMJ is still really bothersome- having trouble with hearing out of the left ear. Last visit pt was having heart palpitations- was suppose to do a heart monitor- never did this because the issues stopped. But now when pt does any physical work for extended periods- he gets pressure in his chest- not pain but it is an odd feeling.)        7/29/2024     9:48 AM   Additional Questions   Roomed by Damari Pete CMA        Health Care Directive  Patient does not have a Health Care Directive or Living Will: Discussed advance care planning with patient; information given to patient to review.    HPI              7/28/2024   General Health   How would you rate your overall physical health? Excellent   Feel stress (tense, anxious, or unable to sleep) Not at all            7/28/2024   Nutrition   Three or more servings of calcium each day? Yes   Diet: Low salt   How many servings of fruit and vegetables per day? (!) 2-3   How many sweetened beverages each day? 0-1            7/28/2024   Exercise   Days per week of moderate/strenous exercise 4 days   Average minutes spent exercising at this level 60 min            7/28/2024   Social Factors   Frequency of gathering with friends or relatives Once a week   Worry food won't last until get money to buy more No   Food not last or not have enough money for food? No   Do you have housing? (Housing is defined as stable permanent housing and does not include staying ouside in a car, in a tent, in an abandoned building, in an overnight shelter, or couch-surfing.) Yes   Are you worried about losing your housing? No   Lack of transportation? No   Unable to get utilities (heat,electricity)? No            7/28/2024   Fall Risk    Fallen 2 or more times in the past year? No   Trouble with walking or balance? No             7/28/2024   Dental   Dentist two times every year? (!) NO            7/28/2024   TB Screening   Were you born outside of the [place]? No              Today's PHQ-2 Score:       1/28/2024     4:20 PM   PHQ-2 ( 1999 [employer])   Q1: Little interest or pleasure in doing things 0   Q2: Feeling down, depressed or hopeless 0   PHQ-2 Score 0   Q1: Little interest or pleasure in doing things Not at all   Q2: Feeling down, depressed or hopeless Not at all   PHQ-2 Score 0         7/28/2024   Substance Use   Alcohol more than 3/day or more than 7/wk No   Do you use any other substances recreationally? No        Social History     Tobacco Use    Smoking status: Never     Passive exposure: Yes    Smokeless tobacco: Never    Tobacco comments:     spouse outside   Substance Use Topics    Alcohol use: Yes     Comment: Alcoholic Drinks/day: 2-4 drinks per week ( a Cooler)    Drug use: No           7/28/2024   STI Screening   New sexual partner(s) since last STI/HIV test? No      Last PSA:   Prostate Specific Antigen Screen   Date Value Ref Range Status   04/07/2021 0.5 0.0 - 4.5 ng/mL Final     ASCVD Risk   The 10-year ASCVD risk score (Avery ERWIN, et al., 2019) is: 8.8%    Values used to calculate the score:      Age: 63 years      Sex: Male      Is Non- : No      Diabetic: No      Tobacco smoker: No      Systolic Blood Pressure: 118 mmHg      Is BP treated: No      HDL Cholesterol: 69 mg/dL      Total Cholesterol: 210 mg/dL           Reviewed and updated as needed this visit by Provider                    Past Medical History:   Diagnosis Date    Kidney stones      Past Surgical History:   Procedure Laterality Date    ARTHROSCOPY SHOULDER ROTATOR CUFF REPAIR Bilateral     COLONOSCOPY  2010    EYE SURGERY  1985    Over 30 years ago     BP Readings from Last 3 Encounters:   07/29/24 118/79   01/29/24 115/74  "  10/13/23 125/77    Wt Readings from Last 3 Encounters:   07/29/24 85.9 kg (189 lb 6.4 oz)   01/29/24 83.8 kg (184 lb 12.8 oz)   10/13/23 85.5 kg (188 lb 9.6 oz)                  Patient Active Problem List   Diagnosis    Joint Pain, Localized In The Knee    Tingling (Paresthesia)    Benign Prostatic Hypertrophy    Joint Pain, Localized In The Shoulder    Anxiety    Benign localized hyperplasia of prostate with urinary obstruction and other lower urinary tract symptoms (LUTS)(600.21)    Benign prostatic hyperplasia    Erectile dysfunction    Hemorrhoids    Polyp of colon     Past Surgical History:   Procedure Laterality Date    ARTHROSCOPY SHOULDER ROTATOR CUFF REPAIR Bilateral     COLONOSCOPY  2010    EYE SURGERY  1985    Over 30 years ago       Social History     Tobacco Use    Smoking status: Never     Passive exposure: Yes    Smokeless tobacco: Never    Tobacco comments:     spouse outside   Substance Use Topics    Alcohol use: Yes     Comment: Alcoholic Drinks/day: 2-4 drinks per week ( a Cooler)     Family History   Problem Relation Age of Onset    Breast Cancer Mother     Multiple Sclerosis Mother     Depression Sister     Multiple Sclerosis Sister     No Known Problems Father          Current Outpatient Medications   Medication Sig Dispense Refill    b complex vitamins tablet [B COMPLEX VITAMINS TABLET] Take 1 tablet by mouth daily.      glucosamine-chondroitin (COSAMIN DS) 500-400 MG tablet       multivitamin capsule [MULTIVITAMIN CAPSULE] Take 1 capsule by mouth daily.           Review of Systems  Constitutional, neuro, ENT, endocrine, pulmonary, cardiac, gastrointestinal, genitourinary, musculoskeletal, integument and psychiatric systems are negative, except as otherwise noted.     Objective    Exam  /79 (BP Location: Left arm, Patient Position: Sitting, Cuff Size: Adult Large)   Pulse 61   Resp 16   Ht 1.727 m (5' 8\")   Wt 85.9 kg (189 lb 6.4 oz)   SpO2 97%   BMI 28.80 kg/m     Estimated " "body mass index is 28.8 kg/m  as calculated from the following:    Height as of this encounter: 1.727 m (5' 8\").    Weight as of this encounter: 85.9 kg (189 lb 6.4 oz).    Physical Exam  GENERAL: alert and no distress  NECK: no adenopathy, no asymmetry, masses, or scars  RESP: lungs clear to auscultation - no rales, rhonchi or wheezes  CV: regular rate and rhythm, normal S1 S2, no S3 or S4, no murmur, click or rub, no peripheral edema  ABDOMEN: soft, nontender, no hepatosplenomegaly, no masses and bowel sounds normal  MS: no gross musculoskeletal defects noted, no edema        Signed Electronically by: Feliberto Gilman MD    "

## 2024-07-29 NOTE — PATIENT INSTRUCTIONS
Patient Education   Preventive Care Advice   This is general advice given by our system to help you stay healthy. However, your care team may have specific advice just for you. Please talk to your care team about your preventive care needs.  Nutrition  Eat 5 or more servings of fruits and vegetables each day.  Try wheat bread, brown rice and whole grain pasta (instead of white bread, rice, and pasta).  Get enough calcium and vitamin D. Check the label on foods and aim for 100% of the RDA (recommended daily allowance).  Lifestyle  Exercise at least 150 minutes each week  (30 minutes a day, 5 days a week).  Do muscle strengthening activities 2 days a week. These help control your weight and prevent disease.  No smoking.  Wear sunscreen to prevent skin cancer.  Have a dental exam and cleaning every 6 months.  Yearly exams  See your health care team every year to talk about:  Any changes in your health.  Any medicines your care team has prescribed.  Preventive care, family planning, and ways to prevent chronic diseases.  Shots (vaccines)   HPV shots (up to age 26), if you've never had them before.  Hepatitis B shots (up to age 59), if you've never had them before.  COVID-19 shot: Get this shot when it's due.  Flu shot: Get a flu shot every year.  Tetanus shot: Get a tetanus shot every 10 years.  Pneumococcal, hepatitis A, and RSV shots: Ask your care team if you need these based on your risk.  Shingles shot (for age 50 and up)  General health tests  Diabetes screening:  Starting at age 35, Get screened for diabetes at least every 3 years.  If you are younger than age 35, ask your care team if you should be screened for diabetes.  Cholesterol test: At age 39, start having a cholesterol test every 5 years, or more often if advised.  Bone density scan (DEXA): At age 50, ask your care team if you should have this scan for osteoporosis (brittle bones).  Hepatitis C: Get tested at least once in your life.  STIs (sexually  transmitted infections)  Before age 24: Ask your care team if you should be screened for STIs.  After age 24: Get screened for STIs if you're at risk. You are at risk for STIs (including HIV) if:  You are sexually active with more than one person.  You don't use condoms every time.  You or a partner was diagnosed with a sexually transmitted infection.  If you are at risk for HIV, ask about PrEP medicine to prevent HIV.  Get tested for HIV at least once in your life, whether you are at risk for HIV or not.  Cancer screening tests  Cervical cancer screening: If you have a cervix, begin getting regular cervical cancer screening tests starting at age 21.  Breast cancer scan (mammogram): If you've ever had breasts, begin having regular mammograms starting at age 40. This is a scan to check for breast cancer.  Colon cancer screening: It is important to start screening for colon cancer at age 45.  Have a colonoscopy test every 10 years (or more often if you're at risk) Or, ask your provider about stool tests like a FIT test every year or Cologuard test every 3 years.  To learn more about your testing options, visit:   .  For help making a decision, visit:   https://bit.ly/ef27725.  Prostate cancer screening test: If you have a prostate, ask your care team if a prostate cancer screening test (PSA) at age 55 is right for you.  Lung cancer screening: If you are a current or former smoker ages 50 to 80, ask your care team if ongoing lung cancer screenings are right for you.  For informational purposes only. Not to replace the advice of your health care provider. Copyright   2023 [employer]. All rights reserved. Clinically reviewed by the [facility] Transitions Program. [employer] [id number] - REV 01/24.

## 2024-08-01 ENCOUNTER — HOSPITAL ENCOUNTER (OUTPATIENT)
Dept: CARDIOLOGY | Facility: HOSPITAL | Age: 64
Discharge: HOME | End: 2024-08-01
Attending: FAMILY MEDICINE
Payer: MEDICARE

## 2024-08-01 ENCOUNTER — HOSPITAL ENCOUNTER (OUTPATIENT)
Dept: NUCLEAR MEDICINE | Facility: HOSPITAL | Age: 64
Discharge: HOME | End: 2024-08-01
Attending: FAMILY MEDICINE
Payer: MEDICARE

## 2024-08-01 DIAGNOSIS — R07.89 SENSATION OF CHEST PRESSURE: ICD-10-CM

## 2024-08-01 DIAGNOSIS — R94.39 ABNORMAL STRESS TEST: Primary | ICD-10-CM

## 2024-08-01 LAB
CV BLOOD PRESSURE: 70 MMHG
CV STRESS CURRENT BP HE: NORMAL
CV STRESS CURRENT HR HE: 102
CV STRESS CURRENT HR HE: 107
CV STRESS CURRENT HR HE: 108
CV STRESS CURRENT HR HE: 115
CV STRESS CURRENT HR HE: 119
CV STRESS CURRENT HR HE: 120
CV STRESS CURRENT HR HE: 124
CV STRESS CURRENT HR HE: 124
CV STRESS CURRENT HR HE: 126
CV STRESS CURRENT HR HE: 126
CV STRESS CURRENT HR HE: 130
CV STRESS CURRENT HR HE: 132
CV STRESS CURRENT HR HE: 133
CV STRESS CURRENT HR HE: 135
CV STRESS CURRENT HR HE: 56
CV STRESS CURRENT HR HE: 58
CV STRESS CURRENT HR HE: 61
CV STRESS CURRENT HR HE: 62
CV STRESS CURRENT HR HE: 62
CV STRESS CURRENT HR HE: 63
CV STRESS CURRENT HR HE: 66
CV STRESS CURRENT HR HE: 67
CV STRESS CURRENT HR HE: 68
CV STRESS CURRENT HR HE: 71
CV STRESS CURRENT HR HE: 78
CV STRESS CURRENT HR HE: 83
CV STRESS CURRENT HR HE: 84
CV STRESS CURRENT HR HE: 85
CV STRESS CURRENT HR HE: 89
CV STRESS DEVIATION TIME HE: NORMAL
CV STRESS ECHO PERCENT HR HE: NORMAL
CV STRESS EXERCISE STAGE HE: NORMAL
CV STRESS EXERCISE STAGE REACHED HE: NORMAL
CV STRESS FINAL RESTING BP HE: NORMAL
CV STRESS FINAL RESTING HR HE: 63
CV STRESS MAX HR HE: 135
CV STRESS MAX TREADMILL GRADE HE: 16
CV STRESS MAX TREADMILL SPEED HE: 4.2
CV STRESS PEAK DIA BP HE: NORMAL
CV STRESS PEAK SYS BP HE: NORMAL
CV STRESS PHASE HE: NORMAL
CV STRESS PROTOCOL HE: NORMAL
CV STRESS RESTING PT POSITION HE: NORMAL
CV STRESS RESTING PT POSITION HE: NORMAL
CV STRESS ST DEVIATION AMOUNT HE: NORMAL
CV STRESS ST DEVIATION ELEVATION HE: NORMAL
CV STRESS ST EVELATION AMOUNT HE: NORMAL
CV STRESS TEST TYPE HE: NORMAL
CV STRESS TOTAL STAGE TIME MIN 1 HE: NORMAL
RATE PRESSURE PRODUCT: NORMAL
STRESS ECHO BASELINE DIASTOLIC HE: 82
STRESS ECHO BASELINE HR: 55
STRESS ECHO BASELINE SYSTOLIC BP: 142
STRESS ECHO CALCULATED PERCENT HR: 87 %
STRESS ECHO LAST STRESS DIASTOLIC BP: 72
STRESS ECHO LAST STRESS HR: 135
STRESS ECHO LAST STRESS SYSTOLIC BP: 154
STRESS ECHO POST ESTIMATED WORKLOAD: 11.5 METS
STRESS ECHO POST EXERCISE DUR MIN: 9 MIN
STRESS ECHO POST EXERCISE DUR SEC: 40 SEC
STRESS ECHO TARGET HR: 156
STRESS ST DEPRESSION: 2 MM

## 2024-08-01 PROCEDURE — 78452 HT MUSCLE IMAGE SPECT MULT: CPT | Mod: MG

## 2024-08-01 PROCEDURE — G1010 CDSM STANSON: HCPCS | Performed by: INTERNAL MEDICINE

## 2024-08-01 PROCEDURE — 93017 CV STRESS TEST TRACING ONLY: CPT

## 2024-08-01 PROCEDURE — 93016 CV STRESS TEST SUPVJ ONLY: CPT | Performed by: INTERNAL MEDICINE

## 2024-08-01 PROCEDURE — 343N000001 HC RX 343: Performed by: FAMILY MEDICINE

## 2024-08-01 PROCEDURE — A9500 TC99M SESTAMIBI: HCPCS | Performed by: FAMILY MEDICINE

## 2024-08-01 PROCEDURE — 93018 CV STRESS TEST I&R ONLY: CPT | Performed by: INTERNAL MEDICINE

## 2024-08-01 PROCEDURE — 78452 HT MUSCLE IMAGE SPECT MULT: CPT | Mod: 26 | Performed by: INTERNAL MEDICINE

## 2024-08-01 RX ADMIN — Medication 8.1 MILLICURIE: at 08:06

## 2024-08-01 NOTE — PROGRESS NOTES
Non-Invasive Heart Care    Pt here at [facility] today for cardiac stress test. Pt requested to walk on treadmill vs. Lexiscan stress test that was originally ordered as his cardiac symptoms occurred with exertion. Pt has no previous cardiac history and very active playing baseball and strenuous workouts frequently. Pt developed mid-sternal, non-radiating chest discomfort rating it 2/10 during stage 3 of stress at 6.47. Chest discomfort got slightly worse at peak, into recovery rating pain 3.5/10 yet still non-radiating and slightly different than what he experienced while mowing the lawn. Chest discomfort improved to 1/10 by 1.14 minutes into recovery and eventually resolved by 4.52 minutes.    Told pt to please avoid any strenuous activity until pt gets results of stress test and if he experiences any chest pain or discomfort to seek medical attention.    Jerrica Cuadra RN

## 2024-08-02 ENCOUNTER — TELEPHONE (OUTPATIENT)
Dept: FAMILY MEDICINE | Facility: CLINIC | Age: 64
End: 2024-08-02
Payer: MEDICARE

## 2024-08-02 NOTE — TELEPHONE ENCOUNTER
Test result discussed with the patient.  He was worried about something catastrophic and I discussed further workup and treatment and evaluation with rapid access referral.  Patient is reassured.  Feliberto Gilman MD

## 2024-08-02 NOTE — TELEPHONE ENCOUNTER
Test Results        Who ordered the test:  Feliberto Gilman     Type of test: Stress Test     Date of test:  08/01/2024    Where was the test performed:  [facility]     What are your questions/concerns?:  Pt calling to discuss stress test results. He would like a call from Feliberto Gilman MD to discuss.     Could we send this information to you in [facility] [place] or would you prefer to receive a phone call?:   Patient would prefer a phone call   Okay to leave a detailed message?: Yes at Cell number on file:    Telephone Information:   Mobile [phone]

## 2024-08-16 ENCOUNTER — DOCUMENTATION ONLY (OUTPATIENT)
Dept: CARDIOLOGY | Facility: CLINIC | Age: 64
End: 2024-08-16

## 2024-08-16 ENCOUNTER — PREP FOR PROCEDURE (OUTPATIENT)
Dept: CARDIOLOGY | Facility: CLINIC | Age: 64
End: 2024-08-16

## 2024-08-16 ENCOUNTER — OFFICE VISIT (OUTPATIENT)
Dept: CARDIOLOGY | Facility: CLINIC | Age: 64
End: 2024-08-16
Attending: FAMILY MEDICINE
Payer: MEDICARE

## 2024-08-16 VITALS
HEART RATE: 55 BPM | SYSTOLIC BLOOD PRESSURE: 121 MMHG | WEIGHT: 188 LBS | DIASTOLIC BLOOD PRESSURE: 79 MMHG | BODY MASS INDEX: 28.49 KG/M2 | HEIGHT: 68 IN | RESPIRATION RATE: 18 BRPM | OXYGEN SATURATION: 99 %

## 2024-08-16 DIAGNOSIS — I25.83 CORONARY ARTERIOSCLEROSIS DUE TO LIPID RICH PLAQUE: ICD-10-CM

## 2024-08-16 DIAGNOSIS — E78.00 PURE HYPERCHOLESTEROLEMIA: ICD-10-CM

## 2024-08-16 DIAGNOSIS — R94.39 ABNORMAL STRESS TEST: Primary | ICD-10-CM

## 2024-08-16 DIAGNOSIS — R94.39 ABNORMAL STRESS TEST: ICD-10-CM

## 2024-08-16 DIAGNOSIS — I25.83 CORONARY ARTERIOSCLEROSIS DUE TO LIPID RICH PLAQUE: Primary | ICD-10-CM

## 2024-08-16 PROCEDURE — G2211 COMPLEX E/M VISIT ADD ON: HCPCS | Performed by: INTERNAL MEDICINE

## 2024-08-16 PROCEDURE — 99205 OFFICE O/P NEW HI 60 MIN: CPT | Performed by: INTERNAL MEDICINE

## 2024-08-16 RX ORDER — ASPIRIN 325 MG
325 TABLET ORAL ONCE
Status: CANCELLED | OUTPATIENT
Start: 2024-08-16 | End: 2024-08-16

## 2024-08-16 RX ORDER — LIDOCAINE 40 MG/G
CREAM TOPICAL
Status: CANCELLED | OUTPATIENT
Start: 2024-08-16

## 2024-08-16 RX ORDER — FENTANYL CITRATE 50 UG/ML
25 INJECTION, SOLUTION INTRAMUSCULAR; INTRAVENOUS
Status: CANCELLED | OUTPATIENT
Start: 2024-08-16

## 2024-08-16 RX ORDER — SODIUM CHLORIDE 9 MG/ML
INJECTION, SOLUTION INTRAVENOUS CONTINUOUS
Status: CANCELLED | OUTPATIENT
Start: 2024-08-16

## 2024-08-16 RX ORDER — OMEGA-3 FATTY ACIDS/FISH OIL 300-1000MG
200 CAPSULE ORAL EVERY 4 HOURS PRN
COMMUNITY

## 2024-08-16 RX ORDER — ASPIRIN 81 MG/1
243 TABLET, CHEWABLE ORAL ONCE
Status: CANCELLED | OUTPATIENT
Start: 2024-08-16

## 2024-08-16 RX ORDER — ATORVASTATIN CALCIUM 20 MG/1
20 TABLET, FILM COATED ORAL DAILY
Qty: 10 TABLET | Refills: 1 | Status: ON HOLD | OUTPATIENT
Start: 2024-08-16 | End: 2024-08-19

## 2024-08-16 RX ORDER — ASPIRIN 81 MG/1
81 TABLET ORAL DAILY
COMMUNITY

## 2024-08-16 NOTE — H&P (VIEW-ONLY)
[facility] Heart Care Office Consult     Assessment:   (I25.10,  I25.83) Coronary arteriosclerosis due to lipid rich plaque  (primary encounter diagnosis)  Comment: Patient abnormal nuclear stress test suspect he has circumflex disease with new onset accelerated angina.  Given this, proceed with invasive angiography, risks, benefits, alternatives, consultation were briefly discussed with him and he is willing to proceed.  Will set that up next week and have her follow-up with me thereafter.  He is already started baby aspirin, he has been instructed to come to medical attention should he have worsening chest discomfort that does not resolve and also to take it easy until then.    (R94.39) Abnormal stress test  Comment: Medium sized area of mild ischemia involving the entire inferolateral wall, suspicious for circumflex disease although cannot rule out right coronary artery, on aspirin, treat lipids, excessive activity, further therapy pending that and follow-up with me after angiography.  Present to the emergency department should he have sustained discomfort.    (E78.00) Pure hypercholesterolemia  Comment: Total cholesterol is 217 with an LDL of 137, unacceptable.  Will start atorvastatin 20 mg and titrate up as tolerated.  Gave him a 10-day supply if he tolerates it well and get over 98 supply, will need repeat fasting lipids in the liver blood tests in about 2 months.     Plan:   1.  Invasive angiography and possible invention.  2.  Continue baby aspirin.  3.  Atorvastatin 20 mg, titrate up to 80 mg and recheck fasting lipids and LFTs.  4.  Follow-up with me thereafter.  5.  Refrain from heavy activity until this is all sorted out, sustained chest discomfort present to the emergency department.    History of Present Illness:   Thank you for asking the [facility] Heart Care team to see Braden Aleman a 64 year old male  in consultation  to evaluate abnormal nuclear stress test.   Patient was in his usual  state of health till about 2 to 3 years ago when he developed some fatigue.  He states he would be running bases during a baseball game he noted some increased shortness of breath and never had before.  About a year later, he had palpitations that he would feel with rest, his heart will skip a beat or jump to be and this has been going on for about a year but not quite severe.  He states over the same period of time he still of the chest pressure sensation, is almost a tightness in his chest, radiated to left side of his chest, comes on with exercise and then will go away with rest.  He states over the last 4 to 5 weeks has had an increase in this, he was mowing the lawn and had to stop mowing his lawn due to the discomfort.  It eventually went away after about 50 minutes.  He mentioned this to his primary care provider, this doctor then obtained a stress exercise nuclear, he developed 2 mm horizontal ST segment depression leads II, 3, aVF, V4, V5, V6 beginning at 5 minutes and 19 seconds in exercise with a medium sized area of mild distal to basal inferolateral and inferior ischemia.  He was sent to the rapid access clinic.    Past Medical History:     Past Medical History:   Diagnosis Date    Kidney stones      Past history is negative for cancer, tuberculosis, diabetes mellitus, myocardial infarction,  rheumatic fever, hypertension, cerebrovascular accident, chronic kidney disease, peptic ulcer disease, chronic obstructive pulmonary disease, or thyroid disorder  and no lipid disorder.     Past Surgical History:     Past Surgical History:   Procedure Laterality Date    ARTHROSCOPY SHOULDER ROTATOR CUFF REPAIR Bilateral     COLONOSCOPY  2010    EYE SURGERY  1985    Over 30 years ago       Family History:   Family history positive for a brother possibly having a heart problem in his 40s or 50s, as well as his father's brother's son dropping dead at the age of 20.    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 He has never used smokeless tobacco. He reports current alcohol use. He reports that he does not currently use drugs after having used the following drugs: Marijuana. The primary care physician is Feliberto Gilman    Meds:   Scheduled Meds:  Current Outpatient Medications   Medication Sig Dispense Refill    aspirin 81 MG EC tablet Take 81 mg by mouth daily      atorvastatin (LIPITOR) 20 MG tablet Take 1 tablet (20 mg) by mouth daily 10 tablet 1    b complex vitamins tablet [B COMPLEX VITAMINS TABLET] Take 1 tablet by mouth daily.      Coenzyme Q10 (CO Q 10 PO) Take 20 mg by mouth daily      glucosamine-chondroitin (COSAMIN DS) 500-400 MG tablet Take 2 tablets by mouth daily      ibuprofen (ADVIL/MOTRIN) 200 MG capsule Take 200 mg by mouth every 4 hours as needed for fever      multivitamin capsule [MULTIVITAMIN CAPSULE] Take 1 capsule by mouth daily.         PRN Meds:.  No current facility-administered medications for this visit.       Allergies:   Patient has no known allergies.    Objective:      Physical Exam  85.3 kg (188 lb)  1.727 m (5' 8\")  Body mass index is 28.59 kg/m .  /79 (BP Location: Right arm, Patient Position: Sitting, Cuff Size: Adult Regular)   Pulse 55   Resp 18   Ht 1.727 m (5' 8\")   Wt 85.3 kg (188 lb)   SpO2 99%   BMI 28.59 kg/m      General Appearance:   Alert, cooperative and in no acute distress.   HEENT:  No scleral icterus; the mucous membranes were pink and moist.   Neck: JVP normal. No thyromegaly. No HJR   Chest: The spine was straight. The chest was symmetric.   Lungs:   Respirations unlabored; the lungs are clear to auscultation.   Cardiovascular:   S1 and S2 without murmur, clicks or rubs. Brachial, radial, carotid and posterior tibial pulses are intact and symetrical.  No " "carotid bruits noted   Abdomen:  No organomegaly, masses, bruits, or tenderness. Bowels sounds are present   Extremities: No cyanosis, clubbing, or edema.   Skin: No xanthelasma.   Neurologic: Mood and affect are appropriate.         Lab Reviewed Personally by myself  Lab Results   Component Value Date     07/29/2024    CO2 26 07/29/2024    CO2 22 04/07/2021    BUN 15.6 07/29/2024    BUN 17 04/07/2021     Lab Results   Component Value Date    WBC 3.5 07/29/2024    HGB 15.0 07/29/2024    HCT 44.3 07/29/2024    MCV 90 07/29/2024     07/29/2024     Lab Results   Component Value Date    CHOL 217 07/29/2024    TRIG 52 07/29/2024    HDL 70 07/29/2024     No results found for: \"BNP\"    ECG personally reviewed by myself shows normal sinus rhythm, within normal limits.     Review of Systems:     Review of Systems:   Enc Vitals  BP: 121/79  Pulse: 55  Resp: 18  SpO2: 99 %  Weight: 85.3 kg (188 lb) (shoes off)  Height: 172.7 cm (5' 8\")                                          "

## 2024-08-16 NOTE — PROGRESS NOTES
[facility] Heart Care Office Consult     Assessment:   (I25.10,  I25.83) Coronary arteriosclerosis due to lipid rich plaque  (primary encounter diagnosis)  Comment: Patient abnormal nuclear stress test suspect he has circumflex disease with new onset accelerated angina.  Given this, proceed with invasive angiography, risks, benefits, alternatives, consultation were briefly discussed with him and he is willing to proceed.  Will set that up next week and have her follow-up with me thereafter.  He is already started baby aspirin, he has been instructed to come to medical attention should he have worsening chest discomfort that does not resolve and also to take it easy until then.    (R94.39) Abnormal stress test  Comment: Medium sized area of mild ischemia involving the entire inferolateral wall, suspicious for circumflex disease although cannot rule out right coronary artery, on aspirin, treat lipids, excessive activity, further therapy pending that and follow-up with me after angiography.  Present to the emergency department should he have sustained discomfort.    (E78.00) Pure hypercholesterolemia  Comment: Total cholesterol is 217 with an LDL of 137, unacceptable.  Will start atorvastatin 20 mg and titrate up as tolerated.  Gave him a 10-day supply if he tolerates it well and get over 98 supply, will need repeat fasting lipids in the liver blood tests in about 2 months.     Plan:   1.  Invasive angiography and possible invention.  2.  Continue baby aspirin.  3.  Atorvastatin 20 mg, titrate up to 80 mg and recheck fasting lipids and LFTs.  4.  Follow-up with me thereafter.  5.  Refrain from heavy activity until this is all sorted out, sustained chest discomfort present to the emergency department.    History of Present Illness:   Thank you for asking the [facility] Heart Care team to see Braden Aleman a 64 year old male  in consultation  to evaluate abnormal nuclear stress test.   Patient was in his usual  state of health till about 2 to 3 years ago when he developed some fatigue.  He states he would be running bases during a baseball game he noted some increased shortness of breath and never had before.  About a year later, he had palpitations that he would feel with rest, his heart will skip a beat or jump to be and this has been going on for about a year but not quite severe.  He states over the same period of time he still of the chest pressure sensation, is almost a tightness in his chest, radiated to left side of his chest, comes on with exercise and then will go away with rest.  He states over the last 4 to 5 weeks has had an increase in this, he was mowing the lawn and had to stop mowing his lawn due to the discomfort.  It eventually went away after about 50 minutes.  He mentioned this to his primary care provider, this doctor then obtained a stress exercise nuclear, he developed 2 mm horizontal ST segment depression leads II, 3, aVF, V4, V5, V6 beginning at 5 minutes and 19 seconds in exercise with a medium sized area of mild distal to basal inferolateral and inferior ischemia.  He was sent to the rapid access clinic.    Past Medical History:     Past Medical History:   Diagnosis Date    Kidney stones      Past history is negative for cancer, tuberculosis, diabetes mellitus, myocardial infarction,  rheumatic fever, hypertension, cerebrovascular accident, chronic kidney disease, peptic ulcer disease, chronic obstructive pulmonary disease, or thyroid disorder  and no lipid disorder.     Past Surgical History:     Past Surgical History:   Procedure Laterality Date    ARTHROSCOPY SHOULDER ROTATOR CUFF REPAIR Bilateral     COLONOSCOPY  2010    EYE SURGERY  1985    Over 30 years ago       Family History:   Family history positive for a brother possibly having a heart problem in his 40s or 50s, as well as his father's brother's son dropping dead at the age of 20.    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 He has never used smokeless tobacco. He reports current alcohol use. He reports that he does not currently use drugs after having used the following drugs: Marijuana. The primary care physician is Feliberto Gilman    Meds:   Scheduled Meds:  Current Outpatient Medications   Medication Sig Dispense Refill    aspirin 81 MG EC tablet Take 81 mg by mouth daily      atorvastatin (LIPITOR) 20 MG tablet Take 1 tablet (20 mg) by mouth daily 10 tablet 1    b complex vitamins tablet [B COMPLEX VITAMINS TABLET] Take 1 tablet by mouth daily.      Coenzyme Q10 (CO Q 10 PO) Take 20 mg by mouth daily      glucosamine-chondroitin (COSAMIN DS) 500-400 MG tablet Take 2 tablets by mouth daily      ibuprofen (ADVIL/MOTRIN) 200 MG capsule Take 200 mg by mouth every 4 hours as needed for fever      multivitamin capsule [MULTIVITAMIN CAPSULE] Take 1 capsule by mouth daily.         PRN Meds:.  No current facility-administered medications for this visit.       Allergies:   Patient has no known allergies.    Objective:      Physical Exam  85.3 kg (188 lb)  1.727 m (5' 8\")  Body mass index is 28.59 kg/m .  /79 (BP Location: Right arm, Patient Position: Sitting, Cuff Size: Adult Regular)   Pulse 55   Resp 18   Ht 1.727 m (5' 8\")   Wt 85.3 kg (188 lb)   SpO2 99%   BMI 28.59 kg/m      General Appearance:   Alert, cooperative and in no acute distress.   HEENT:  No scleral icterus; the mucous membranes were pink and moist.   Neck: JVP normal. No thyromegaly. No HJR   Chest: The spine was straight. The chest was symmetric.   Lungs:   Respirations unlabored; the lungs are clear to auscultation.   Cardiovascular:   S1 and S2 without murmur, clicks or rubs. Brachial, radial, carotid and posterior tibial pulses are intact and symetrical.  No " "carotid bruits noted   Abdomen:  No organomegaly, masses, bruits, or tenderness. Bowels sounds are present   Extremities: No cyanosis, clubbing, or edema.   Skin: No xanthelasma.   Neurologic: Mood and affect are appropriate.         Lab Reviewed Personally by myself  Lab Results   Component Value Date     07/29/2024    CO2 26 07/29/2024    CO2 22 04/07/2021    BUN 15.6 07/29/2024    BUN 17 04/07/2021     Lab Results   Component Value Date    WBC 3.5 07/29/2024    HGB 15.0 07/29/2024    HCT 44.3 07/29/2024    MCV 90 07/29/2024     07/29/2024     Lab Results   Component Value Date    CHOL 217 07/29/2024    TRIG 52 07/29/2024    HDL 70 07/29/2024     No results found for: \"BNP\"    ECG personally reviewed by myself shows normal sinus rhythm, within normal limits.     Review of Systems:     Review of Systems:   Enc Vitals  BP: 121/79  Pulse: 55  Resp: 18  SpO2: 99 %  Weight: 85.3 kg (188 lb) (shoes off)  Height: 172.7 cm (5' 8\")                                          "

## 2024-08-16 NOTE — PROGRESS NOTES
Braden Aleman  [address] [facility] [address]  [phone] (home) [phone] (work)    Procedure cardiologist:  Dr. Dinh or Dr. Collins  PCP:  Feliberto Gilman  H&P completed by:  8/16/24 [facility]  Admit date 8/19/24  Arrival time:  0730  Anticoagulation: None  CPAP: Has CHRIS - doesn't own CPAP, uses mouthguard  Previous PCI: No  Bypass Grafts: No  Renal Issues: No  Diabetic?: No  Device?: No  Type:  N/A  Ambulation status: Independent    Patient Education/  Angiogram Teaching    Reason for Visit:  Patient seen for pre-procedure education in preparation for: Cor Angio poss PCI    Procedure Prep:  Primary Cardiologist note dated: [facility] 8/16/24  EKG results obtained, dated: to be done on admission  Hemogram results obtained:  to be done on admission  Basic Metabolic Panel results obtained:  to be done on admission  Lipid Profile results obtained: 7/29/24  Pertinent cardiac test results: 8/1/24 Abnormal stress test    Pre-procedure instructions  Patient instructed to be NPO per anesthesia guidelines.  Patient instructed to shower the evening before or the morning of the procedure.  Patient instructed to arrange for transportation home following procedure from a responsible family member of friend (Wife, Marianne). No driving for at least 24 hours.  Patient instructed to have a responsible adult with them for 24 hours post-procedure (Wife, Marianne).  Post-procedure follow up process.  Conscious sedation discussed.    Pre-procedure medication instructions  Patient instructed on antiplatelet medication.  Continue medications as scheduled, with a small amount of water on the day of the procedure unless indicated.  Patient instructed to take 4-81 mg of Aspirin AM of procedure: yes  Other medication: Instructed to TAKE Atorvastatin AM of the procedure. Instructed to HOLD all OTC medications.      Patient states understanding of procedure and agrees to proceed.    *PATIENTS RECORDS AVAILABLE IN EPIC UNLESS OTHERWISE  INDICATED*      Patient Active Problem List   Diagnosis    Joint Pain, Localized In The Knee    Tingling (Paresthesia)    Benign Prostatic Hypertrophy    Joint Pain, Localized In The Shoulder    Anxiety    Benign localized hyperplasia of prostate with urinary obstruction and other lower urinary tract symptoms (LUTS)(600.21)    Benign prostatic hyperplasia    Erectile dysfunction    Hemorrhoids    Polyp of colon    Coronary arteriosclerosis due to lipid rich plaque    Pure hypercholesterolemia       Current Outpatient Medications   Medication Sig Dispense Refill    aspirin 81 MG EC tablet Take 81 mg by mouth daily      atorvastatin (LIPITOR) 20 MG tablet Take 1 tablet (20 mg) by mouth daily 10 tablet 1    b complex vitamins tablet [B COMPLEX VITAMINS TABLET] Take 1 tablet by mouth daily.      Coenzyme Q10 (CO Q 10 PO) Take 20 mg by mouth daily      glucosamine-chondroitin (COSAMIN DS) 500-400 MG tablet Take 2 tablets by mouth daily      ibuprofen (ADVIL/MOTRIN) 200 MG capsule Take 200 mg by mouth every 4 hours as needed for fever      multivitamin capsule [MULTIVITAMIN CAPSULE] Take 1 capsule by mouth daily.         No Known Allergies      Serena Briseno, RN, BSN

## 2024-08-16 NOTE — LETTER
8/16/2024    Feliberto Gilman MD  [address]  [facility] [address]    RE: Braden Aleman       Dear Colleague,     I had the pleasure of seeing Braden Aleman in the [facility].    [facility] Heart Care Office Consult     Assessment:   (I25.10,  I25.83) Coronary arteriosclerosis due to lipid rich plaque  (primary encounter diagnosis)  Comment: Patient abnormal nuclear stress test suspect he has circumflex disease with new onset accelerated angina.  Given this, proceed with invasive angiography, risks, benefits, alternatives, consultation were briefly discussed with him and he is willing to proceed.  Will set that up next week and have her follow-up with me thereafter.  He is already started baby aspirin, he has been instructed to come to medical attention should he have worsening chest discomfort that does not resolve and also to take it easy until then.    (R94.39) Abnormal stress test  Comment: Medium sized area of mild ischemia involving the entire inferolateral wall, suspicious for circumflex disease although cannot rule out right coronary artery, on aspirin, treat lipids, excessive activity, further therapy pending that and follow-up with me after angiography.  Present to the emergency department should he have sustained discomfort.    (E78.00) Pure hypercholesterolemia  Comment: Total cholesterol is 217 with an LDL of 137, unacceptable.  Will start atorvastatin 20 mg and titrate up as tolerated.  Gave him a 10-day supply if he tolerates it well and get over 98 supply, will need repeat fasting lipids in the liver blood tests in about 2 months.     Plan:   1.  Invasive angiography and possible invention.  2.  Continue baby aspirin.  3.  Atorvastatin 20 mg, titrate up to 80 mg and recheck fasting lipids and LFTs.  4.  Follow-up with me thereafter.  5.  Refrain from heavy activity until this is all sorted out, sustained chest discomfort present to the emergency  department.    History of Present Illness:   Thank you for asking the [facility] Heart Care team to see Braden Aleman a 64 year old male  in consultation  to evaluate abnormal nuclear stress test.   Patient was in his usual state of health till about 2 to 3 years ago when he developed some fatigue.  He states he would be running bases during a baseball game he noted some increased shortness of breath and never had before.  About a year later, he had palpitations that he would feel with rest, his heart will skip a beat or jump to be and this has been going on for about a year but not quite severe.  He states over the same period of time he still of the chest pressure sensation, is almost a tightness in his chest, radiated to left side of his chest, comes on with exercise and then will go away with rest.  He states over the last 4 to 5 weeks has had an increase in this, he was mowing the lawn and had to stop mowing his lawn due to the discomfort.  It eventually went away after about 50 minutes.  He mentioned this to his primary care provider, this doctor then obtained a stress exercise nuclear, he developed 2 mm horizontal ST segment depression leads II, 3, aVF, V4, V5, V6 beginning at 5 minutes and 19 seconds in exercise with a medium sized area of mild distal to basal inferolateral and inferior ischemia.  He was sent to the rapid access clinic.    Past Medical History:     Past Medical History:   Diagnosis Date     Kidney stones      Past history is negative for cancer, tuberculosis, diabetes mellitus, myocardial infarction,  rheumatic fever, hypertension, cerebrovascular accident, chronic kidney disease, peptic ulcer disease, chronic obstructive pulmonary disease, or thyroid disorder  and no lipid disorder.     Past Surgical History:     Past Surgical History:   Procedure Laterality Date     ARTHROSCOPY SHOULDER ROTATOR CUFF REPAIR Bilateral      COLONOSCOPY  2010     EYE SURGERY  1985    Over 30 years ago  "      Family History:   Family history positive for a brother possibly having a heart problem in his 40s or 50s, as well as his father's brother's son dropping dead at the age of 20.    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 He has never used smokeless tobacco. He reports current alcohol use. He reports that he does not currently use drugs after having used the following drugs: Marijuana. The primary care physician is Feliberto Gilman    Meds:   Scheduled Meds:  Current Outpatient Medications   Medication Sig Dispense Refill     aspirin 81 MG EC tablet Take 81 mg by mouth daily       atorvastatin (LIPITOR) 20 MG tablet Take 1 tablet (20 mg) by mouth daily 10 tablet 1     b complex vitamins tablet [B COMPLEX VITAMINS TABLET] Take 1 tablet by mouth daily.       Coenzyme Q10 (CO Q 10 PO) Take 20 mg by mouth daily       glucosamine-chondroitin (COSAMIN DS) 500-400 MG tablet Take 2 tablets by mouth daily       ibuprofen (ADVIL/MOTRIN) 200 MG capsule Take 200 mg by mouth every 4 hours as needed for fever       multivitamin capsule [MULTIVITAMIN CAPSULE] Take 1 capsule by mouth daily.         PRN Meds:.  No current facility-administered medications for this visit.       Allergies:   Patient has no known allergies.    Objective:      Physical Exam  85.3 kg (188 lb)  1.727 m (5' 8\")  Body mass index is 28.59 kg/m .  /79 (BP Location: Right arm, Patient Position: Sitting, Cuff Size: Adult Regular)   Pulse 55   Resp 18   Ht 1.727 m (5' 8\")   Wt 85.3 kg (188 lb)   SpO2 99%   BMI 28.59 kg/m      General Appearance:   Alert, cooperative and in no acute distress.   HEENT:  No scleral icterus; the mucous membranes were pink and moist.   Neck: JVP normal. No thyromegaly. No HJR   Chest: The spine was straight. The " "chest was symmetric.   Lungs:   Respirations unlabored; the lungs are clear to auscultation.   Cardiovascular:   S1 and S2 without murmur, clicks or rubs. Brachial, radial, carotid and posterior tibial pulses are intact and symetrical.  No carotid bruits noted   Abdomen:  No organomegaly, masses, bruits, or tenderness. Bowels sounds are present   Extremities: No cyanosis, clubbing, or edema.   Skin: No xanthelasma.   Neurologic: Mood and affect are appropriate.         Lab Reviewed Personally by myself  Lab Results   Component Value Date     07/29/2024    CO2 26 07/29/2024    CO2 22 04/07/2021    BUN 15.6 07/29/2024    BUN 17 04/07/2021     Lab Results   Component Value Date    WBC 3.5 07/29/2024    HGB 15.0 07/29/2024    HCT 44.3 07/29/2024    MCV 90 07/29/2024     07/29/2024     Lab Results   Component Value Date    CHOL 217 07/29/2024    TRIG 52 07/29/2024    HDL 70 07/29/2024     No results found for: \"BNP\"    ECG personally reviewed by myself shows normal sinus rhythm, within normal limits.     Review of Systems:     Review of Systems:   Enc Vitals  BP: 121/79  Pulse: 55  Resp: 18  SpO2: 99 %  Weight: 85.3 kg (188 lb) (shoes off)  Height: 172.7 cm (5' 8\")                                              Thank you for allowing me to participate in the care of your patient.      Sincerely,     ENMANUEL PENALOZA MD     [facility]  cc:   Feliberto Gilman MD  [address] [place] [address]      "

## 2024-08-19 ENCOUNTER — HOSPITAL ENCOUNTER (OUTPATIENT)
Facility: HOSPITAL | Age: 64
Discharge: HOME | End: 2024-08-19
Attending: INTERNAL MEDICINE | Admitting: INTERNAL MEDICINE
Payer: MEDICARE

## 2024-08-19 VITALS
HEIGHT: 68 IN | BODY MASS INDEX: 28.34 KG/M2 | SYSTOLIC BLOOD PRESSURE: 137 MMHG | WEIGHT: 187 LBS | HEART RATE: 61 BPM | OXYGEN SATURATION: 96 % | RESPIRATION RATE: 20 BRPM | TEMPERATURE: 97.7 F | DIASTOLIC BLOOD PRESSURE: 72 MMHG

## 2024-08-19 DIAGNOSIS — I25.83 CORONARY ARTERIOSCLEROSIS DUE TO LIPID RICH PLAQUE: ICD-10-CM

## 2024-08-19 DIAGNOSIS — I25.10 CORONARY ARTERY DISEASE DUE TO CALCIFIED CORONARY LESION: ICD-10-CM

## 2024-08-19 DIAGNOSIS — Z95.5 S/P RIGHT CORONARY ARTERY (RCA) STENT PLACEMENT: Primary | ICD-10-CM

## 2024-08-19 DIAGNOSIS — I25.84 CORONARY ARTERY DISEASE DUE TO CALCIFIED CORONARY LESION: ICD-10-CM

## 2024-08-19 DIAGNOSIS — R94.39 ABNORMAL STRESS TEST: ICD-10-CM

## 2024-08-19 DIAGNOSIS — E78.00 PURE HYPERCHOLESTEROLEMIA: ICD-10-CM

## 2024-08-19 PROBLEM — Z98.890 STATUS POST CORONARY ANGIOGRAM: Status: ACTIVE | Noted: 2024-08-19

## 2024-08-19 LAB
ABO/RH(D): NORMAL
ACT BLD: 270 SECONDS (ref 74–150)
ACT BLD: 329 SECONDS (ref 74–150)
ANION GAP SERPL CALCULATED.3IONS-SCNC: 10 MMOL/L (ref 7–15)
ANTIBODY SCREEN: NEGATIVE
ATRIAL RATE - MUSE: 56 BPM
BUN SERPL-MCNC: 15.4 MG/DL (ref 8–23)
CALCIUM SERPL-MCNC: 9.3 MG/DL (ref 8.8–10.4)
CHLORIDE SERPL-SCNC: 105 MMOL/L (ref 98–107)
CREAT SERPL-MCNC: 0.83 MG/DL (ref 0.67–1.17)
DIASTOLIC BLOOD PRESSURE - MUSE: NORMAL MMHG
EGFRCR SERPLBLD CKD-EPI 2021: >90 ML/MIN/1.73M2
ERYTHROCYTE [DISTWIDTH] IN BLOOD BY AUTOMATED COUNT: 12.6 % (ref 10–15)
GLUCOSE SERPL-MCNC: 116 MG/DL (ref 70–99)
HCO3 SERPL-SCNC: 26 MMOL/L (ref 22–29)
HCT VFR BLD AUTO: 44.3 % (ref 40–53)
HGB BLD-MCNC: 14.8 G/DL (ref 13.3–17.7)
INTERPRETATION ECG - MUSE: NORMAL
MCH RBC QN AUTO: 29.8 PG (ref 26.5–33)
MCHC RBC AUTO-ENTMCNC: 33.4 G/DL (ref 31.5–36.5)
MCV RBC AUTO: 89 FL (ref 78–100)
P AXIS - MUSE: 45 DEGREES
PLATELET # BLD AUTO: 244 10E3/UL (ref 150–450)
POTASSIUM SERPL-SCNC: 4.5 MMOL/L (ref 3.4–5.3)
PR INTERVAL - MUSE: 226 MS
QRS DURATION - MUSE: 84 MS
QT - MUSE: 424 MS
QTC - MUSE: 409 MS
R AXIS - MUSE: 47 DEGREES
RBC # BLD AUTO: 4.97 10E6/UL (ref 4.4–5.9)
SODIUM SERPL-SCNC: 141 MMOL/L (ref 135–145)
SPECIMEN EXPIRATION DATE: NORMAL
SYSTOLIC BLOOD PRESSURE - MUSE: NORMAL MMHG
T AXIS - MUSE: 53 DEGREES
VENTRICULAR RATE- MUSE: 56 BPM
WBC # BLD AUTO: 4.6 10E3/UL (ref 4–11)

## 2024-08-19 PROCEDURE — 93010 ELECTROCARDIOGRAM REPORT: CPT | Performed by: STUDENT IN AN ORGANIZED HEALTH CARE EDUCATION/TRAINING PROGRAM

## 2024-08-19 PROCEDURE — 92928 PRQ TCAT PLMT NTRAC ST 1 LES: CPT | Mod: LC | Performed by: INTERNAL MEDICINE

## 2024-08-19 PROCEDURE — 93005 ELECTROCARDIOGRAM TRACING: CPT

## 2024-08-19 PROCEDURE — 255N000002 HC RX 255 OP 636: Performed by: INTERNAL MEDICINE

## 2024-08-19 PROCEDURE — 258N000003 HC RX IP 258 OP 636: Performed by: INTERNAL MEDICINE

## 2024-08-19 PROCEDURE — 250N000013 HC RX MED GY IP 250 OP 250 PS 637: Performed by: NURSE PRACTITIONER

## 2024-08-19 PROCEDURE — 250N000009 HC RX 250: Performed by: INTERNAL MEDICINE

## 2024-08-19 PROCEDURE — 99152 MOD SED SAME PHYS/QHP 5/>YRS: CPT | Performed by: INTERNAL MEDICINE

## 2024-08-19 PROCEDURE — 92972 PERQ TRLUML CORONRY LITHOTRP: CPT | Performed by: INTERNAL MEDICINE

## 2024-08-19 PROCEDURE — C9601 PERC DRUG-EL COR STENT BRAN: HCPCS | Mod: LC | Performed by: INTERNAL MEDICINE

## 2024-08-19 PROCEDURE — 85347 COAGULATION TIME ACTIVATED: CPT | Mod: 91

## 2024-08-19 PROCEDURE — C1725 CATH, TRANSLUMIN NON-LASER: HCPCS | Performed by: INTERNAL MEDICINE

## 2024-08-19 PROCEDURE — C1769 GUIDE WIRE: HCPCS | Performed by: INTERNAL MEDICINE

## 2024-08-19 PROCEDURE — 999N000054 HC STATISTIC EKG NON-CHARGEABLE

## 2024-08-19 PROCEDURE — C1894 INTRO/SHEATH, NON-LASER: HCPCS | Performed by: INTERNAL MEDICINE

## 2024-08-19 PROCEDURE — 86900 BLOOD TYPING SEROLOGIC ABO: CPT | Performed by: INTERNAL MEDICINE

## 2024-08-19 PROCEDURE — 93458 L HRT ARTERY/VENTRICLE ANGIO: CPT | Mod: 26 | Performed by: INTERNAL MEDICINE

## 2024-08-19 PROCEDURE — 250N000011 HC RX IP 250 OP 636: Performed by: INTERNAL MEDICINE

## 2024-08-19 PROCEDURE — C1874 STENT, COATED/COV W/DEL SYS: HCPCS | Performed by: INTERNAL MEDICINE

## 2024-08-19 PROCEDURE — 92928 PRQ TCAT PLMT NTRAC ST 1 LES: CPT | Mod: RC | Performed by: INTERNAL MEDICINE

## 2024-08-19 PROCEDURE — 99153 MOD SED SAME PHYS/QHP EA: CPT | Performed by: INTERNAL MEDICINE

## 2024-08-19 PROCEDURE — 92929 PR PRQ TRLUML CORONARY BM STENT W/ANGIO ADDL ART/BRNCH: CPT | Mod: LC | Performed by: INTERNAL MEDICINE

## 2024-08-19 PROCEDURE — 93458 L HRT ARTERY/VENTRICLE ANGIO: CPT | Mod: XU | Performed by: INTERNAL MEDICINE

## 2024-08-19 PROCEDURE — 80048 BASIC METABOLIC PNL TOTAL CA: CPT | Performed by: INTERNAL MEDICINE

## 2024-08-19 PROCEDURE — 250N000013 HC RX MED GY IP 250 OP 250 PS 637: Performed by: INTERNAL MEDICINE

## 2024-08-19 PROCEDURE — C1887 CATHETER, GUIDING: HCPCS | Performed by: INTERNAL MEDICINE

## 2024-08-19 PROCEDURE — C9600 PERC DRUG-EL COR STENT SING: HCPCS | Performed by: INTERNAL MEDICINE

## 2024-08-19 PROCEDURE — 85027 COMPLETE CBC AUTOMATED: CPT | Performed by: INTERNAL MEDICINE

## 2024-08-19 PROCEDURE — 272N000001 HC OR GENERAL SUPPLY STERILE: Performed by: INTERNAL MEDICINE

## 2024-08-19 PROCEDURE — C1761 HC OR CATH, TRANS INTRA LITHO/CORONARY: HCPCS | Performed by: INTERNAL MEDICINE

## 2024-08-19 PROCEDURE — 36415 COLL VENOUS BLD VENIPUNCTURE: CPT | Performed by: INTERNAL MEDICINE

## 2024-08-19 DEVICE — STENT COR ONYX FRONTIER 30X3.50MM ONYXNG35030UX: Type: IMPLANTABLE DEVICE | Site: CORONARY | Status: FUNCTIONAL

## 2024-08-19 DEVICE — STENT CORONARY SYNERGY XD MR US 2.50X48MM H7493941848250: Type: IMPLANTABLE DEVICE | Site: CORONARY | Status: FUNCTIONAL

## 2024-08-19 DEVICE — IMPLANTABLE DEVICE: Type: IMPLANTABLE DEVICE | Site: CORONARY | Status: FUNCTIONAL

## 2024-08-19 RX ORDER — METOPROLOL TARTRATE 1 MG/ML
5 INJECTION, SOLUTION INTRAVENOUS
Status: DISCONTINUED | OUTPATIENT
Start: 2024-08-19 | End: 2024-08-19 | Stop reason: HOSPADM

## 2024-08-19 RX ORDER — SODIUM CHLORIDE 9 MG/ML
INJECTION, SOLUTION INTRAVENOUS CONTINUOUS
Status: ACTIVE | OUTPATIENT
Start: 2024-08-19 | End: 2024-08-19

## 2024-08-19 RX ORDER — OXYCODONE HYDROCHLORIDE 5 MG/1
10 TABLET ORAL EVERY 4 HOURS PRN
Status: DISCONTINUED | OUTPATIENT
Start: 2024-08-19 | End: 2024-08-19 | Stop reason: HOSPADM

## 2024-08-19 RX ORDER — HEPARIN SODIUM 200 [USP'U]/100ML
INJECTION, SOLUTION INTRAVENOUS
Status: DISCONTINUED | OUTPATIENT
Start: 2024-08-19 | End: 2024-08-19 | Stop reason: HOSPADM

## 2024-08-19 RX ORDER — NALOXONE HYDROCHLORIDE 0.4 MG/ML
0.2 INJECTION, SOLUTION INTRAMUSCULAR; INTRAVENOUS; SUBCUTANEOUS
Status: DISCONTINUED | OUTPATIENT
Start: 2024-08-19 | End: 2024-08-19 | Stop reason: HOSPADM

## 2024-08-19 RX ORDER — HEPARIN SODIUM 1000 [USP'U]/ML
INJECTION, SOLUTION INTRAVENOUS; SUBCUTANEOUS
Status: DISCONTINUED | OUTPATIENT
Start: 2024-08-19 | End: 2024-08-19 | Stop reason: HOSPADM

## 2024-08-19 RX ORDER — ASPIRIN 81 MG/1
81 TABLET ORAL DAILY
Status: DISCONTINUED | OUTPATIENT
Start: 2024-08-20 | End: 2024-08-19 | Stop reason: HOSPADM

## 2024-08-19 RX ORDER — CLOPIDOGREL BISULFATE 75 MG/1
75 TABLET ORAL DAILY
Status: DISCONTINUED | OUTPATIENT
Start: 2024-08-20 | End: 2024-08-19 | Stop reason: HOSPADM

## 2024-08-19 RX ORDER — ONDANSETRON 4 MG/1
4 TABLET, ORALLY DISINTEGRATING ORAL EVERY 6 HOURS PRN
Status: DISCONTINUED | OUTPATIENT
Start: 2024-08-19 | End: 2024-08-19 | Stop reason: HOSPADM

## 2024-08-19 RX ORDER — FENTANYL CITRATE 50 UG/ML
25 INJECTION, SOLUTION INTRAMUSCULAR; INTRAVENOUS
Status: DISCONTINUED | OUTPATIENT
Start: 2024-08-19 | End: 2024-08-19 | Stop reason: HOSPADM

## 2024-08-19 RX ORDER — DIAZEPAM 5 MG
5 TABLET ORAL ONCE
Status: COMPLETED | OUTPATIENT
Start: 2024-08-19 | End: 2024-08-19

## 2024-08-19 RX ORDER — CLOPIDOGREL BISULFATE 75 MG/1
TABLET ORAL
Status: DISCONTINUED | OUTPATIENT
Start: 2024-08-19 | End: 2024-08-19 | Stop reason: HOSPADM

## 2024-08-19 RX ORDER — ACETAMINOPHEN 325 MG/1
650 TABLET ORAL EVERY 4 HOURS PRN
Status: DISCONTINUED | OUTPATIENT
Start: 2024-08-19 | End: 2024-08-19 | Stop reason: HOSPADM

## 2024-08-19 RX ORDER — ATORVASTATIN CALCIUM 80 MG/1
80 TABLET, FILM COATED ORAL DAILY
Qty: 90 TABLET | Refills: 3 | Status: SHIPPED | OUTPATIENT
Start: 2024-08-19

## 2024-08-19 RX ORDER — NALOXONE HYDROCHLORIDE 0.4 MG/ML
0.4 INJECTION, SOLUTION INTRAMUSCULAR; INTRAVENOUS; SUBCUTANEOUS
Status: DISCONTINUED | OUTPATIENT
Start: 2024-08-19 | End: 2024-08-19 | Stop reason: HOSPADM

## 2024-08-19 RX ORDER — SODIUM CHLORIDE 9 MG/ML
INJECTION, SOLUTION INTRAVENOUS CONTINUOUS
Status: DISCONTINUED | OUTPATIENT
Start: 2024-08-19 | End: 2024-08-19 | Stop reason: HOSPADM

## 2024-08-19 RX ORDER — FENTANYL CITRATE 50 UG/ML
INJECTION, SOLUTION INTRAMUSCULAR; INTRAVENOUS
Status: DISCONTINUED | OUTPATIENT
Start: 2024-08-19 | End: 2024-08-19 | Stop reason: HOSPADM

## 2024-08-19 RX ORDER — NITROGLYCERIN 0.4 MG/1
0.4 TABLET SUBLINGUAL EVERY 5 MIN PRN
Status: DISCONTINUED | OUTPATIENT
Start: 2024-08-19 | End: 2024-08-19 | Stop reason: HOSPADM

## 2024-08-19 RX ORDER — HYDRALAZINE HYDROCHLORIDE 20 MG/ML
10 INJECTION INTRAMUSCULAR; INTRAVENOUS EVERY 4 HOURS PRN
Status: DISCONTINUED | OUTPATIENT
Start: 2024-08-19 | End: 2024-08-19 | Stop reason: HOSPADM

## 2024-08-19 RX ORDER — ASPIRIN 325 MG
325 TABLET ORAL ONCE
Status: COMPLETED | OUTPATIENT
Start: 2024-08-19 | End: 2024-08-19

## 2024-08-19 RX ORDER — NITROGLYCERIN 5 MG/ML
VIAL (ML) INTRAVENOUS
Status: DISCONTINUED | OUTPATIENT
Start: 2024-08-19 | End: 2024-08-19 | Stop reason: HOSPADM

## 2024-08-19 RX ORDER — ATORVASTATIN CALCIUM 40 MG/1
40 TABLET, FILM COATED ORAL DAILY
Qty: 90 TABLET | Refills: 3 | Status: SHIPPED | OUTPATIENT
Start: 2024-08-19 | End: 2024-08-19

## 2024-08-19 RX ORDER — OXYCODONE HYDROCHLORIDE 5 MG/1
5 TABLET ORAL EVERY 4 HOURS PRN
Status: DISCONTINUED | OUTPATIENT
Start: 2024-08-19 | End: 2024-08-19 | Stop reason: HOSPADM

## 2024-08-19 RX ORDER — ONDANSETRON 2 MG/ML
4 INJECTION INTRAMUSCULAR; INTRAVENOUS EVERY 6 HOURS PRN
Status: DISCONTINUED | OUTPATIENT
Start: 2024-08-19 | End: 2024-08-19 | Stop reason: HOSPADM

## 2024-08-19 RX ORDER — LIDOCAINE 40 MG/G
CREAM TOPICAL
Status: DISCONTINUED | OUTPATIENT
Start: 2024-08-19 | End: 2024-08-19 | Stop reason: HOSPADM

## 2024-08-19 RX ORDER — FLUMAZENIL 0.1 MG/ML
0.2 INJECTION, SOLUTION INTRAVENOUS
Status: DISCONTINUED | OUTPATIENT
Start: 2024-08-19 | End: 2024-08-19 | Stop reason: HOSPADM

## 2024-08-19 RX ORDER — ATORVASTATIN CALCIUM 40 MG/1
80 TABLET, FILM COATED ORAL DAILY
Qty: 90 TABLET | Refills: 3 | Status: SHIPPED | OUTPATIENT
Start: 2024-08-19 | End: 2024-08-19

## 2024-08-19 RX ORDER — CLOPIDOGREL BISULFATE 75 MG/1
75 TABLET ORAL DAILY
Qty: 90 TABLET | Refills: 3 | Status: SHIPPED | OUTPATIENT
Start: 2024-08-20

## 2024-08-19 RX ORDER — ATROPINE SULFATE 0.1 MG/ML
0.5 INJECTION INTRAVENOUS
Status: DISCONTINUED | OUTPATIENT
Start: 2024-08-19 | End: 2024-08-19 | Stop reason: HOSPADM

## 2024-08-19 RX ORDER — ASPIRIN 81 MG/1
243 TABLET, CHEWABLE ORAL ONCE
Status: COMPLETED | OUTPATIENT
Start: 2024-08-19 | End: 2024-08-19

## 2024-08-19 RX ORDER — IODIXANOL 320 MG/ML
INJECTION, SOLUTION INTRAVASCULAR
Status: DISCONTINUED | OUTPATIENT
Start: 2024-08-19 | End: 2024-08-19 | Stop reason: HOSPADM

## 2024-08-19 RX ADMIN — DIAZEPAM 5 MG: 5 TABLET ORAL at 09:52

## 2024-08-19 RX ADMIN — SODIUM CHLORIDE: 9 INJECTION, SOLUTION INTRAVENOUS at 08:59

## 2024-08-19 ASSESSMENT — ACTIVITIES OF DAILY LIVING (ADL)
ADLS_ACUITY_SCORE: 35

## 2024-08-19 ASSESSMENT — EJECTION FRACTION: EF_VALUE: .24

## 2024-08-19 NOTE — Clinical Note
The first balloon was inserted into the circumflex and proximal circumflex.Max pressure = 16 alfonso. Total duration = 16 seconds.     Max pressure = 12 alfonso. Total duration = 7 seconds.    Balloon reinflated a second time: Max pressure = 12 alfonso. Total duration = 7 seconds.

## 2024-08-19 NOTE — Clinical Note
The first balloon was inserted into the right coronary artery and middle right coronary artery.Max pressure = 16 alfonso. Total duration = 10 seconds.     Max pressure = 16 alfonso. Total duration = 10 seconds.    Balloon reinflated a second time: Max pressure = 16 alfonso. Total duration = 10 seconds.  Balloon reinflated a third time: Max pressure = 16 alfonso. Total duration = 7 seconds.  Balloon reinflated a fourth time: Max pressure = 10 alfonso. Total  duration = 4 seconds.  Balloon reinflated a fourth time: Max pressure = 14 alfonso. Total duration = 8 seconds.

## 2024-08-19 NOTE — Clinical Note
The first balloon was inserted into the circumflex and distal circumflex.Max pressure = 12 alfonso. Total duration = 8 seconds.     Max pressure = 14 alfonso. Total duration = 7 seconds.    Balloon reinflated a second time: Max pressure = 14 alfonso. Total duration = 7 seconds.

## 2024-08-19 NOTE — Clinical Note
The first balloon was inserted into the right coronary artery and middle right coronary artery.Max pressure = 8 alfonso. Total duration = 19 seconds.

## 2024-08-19 NOTE — Clinical Note
The first balloon was inserted into the circumflex and marginal circumflex.Max pressure = 2 alfonso.     Max pressure = 3 alfonso.   OM 1/shockwave delivered.  Balloon reinflated a second time: Max pressure = 3 alfonso.Shockwave delivery Balloon reinflated a third time: Max pressure = 4 alfonso.Shockwave delivery Balloon reinflated a fourth time: Max pressure = 4 alfonso.Shockwave delivery Balloon reinflated a fourth time: Max pressure = 4 alfonso.Jaime brown

## 2024-08-19 NOTE — Clinical Note
The first balloon was inserted into the circumflex and distal circumflex.Max pressure = 7 alfonso. Total duration = 21 seconds.

## 2024-08-19 NOTE — PLAN OF CARE
Pt discharged to home. Right wrist CDI. No questions. Pt verbalized understanding of instructions. Follow up appointments made.

## 2024-08-19 NOTE — Clinical Note
The first balloon was inserted into the circumflex and proximal circumflex.Max pressure = 2 alfonso.     Max pressure = 2 alfonso.   Shockwave delivered..  Balloon reinflated a second time: Max pressure = 2 alfonso.Shockwave delivered Balloon reinflated a third time: Max pressure = 2 alfonso.Shockwave delivered Balloon reinflated a fourth time: Max pressure = 3 alfonso.Shockwave delivered Balloon reinflated a fourth time: Max pressure = 3 alfonso.Shockwave del ivered

## 2024-08-19 NOTE — INTERVAL H&P NOTE
"I have reviewed the surgical (or preoperative) H&P that is linked to this encounter, and examined the patient. There are no significant changes    Clinical Conditions Present on Arrival:  Clinically Significant Risk Factors Present on Admission                 # Drug Induced Platelet Defect: home medication list includes an antiplatelet medication      # Overweight: Estimated body mass index is 28.43 kg/m  as calculated from the following:    Height as of this encounter: 1.727 m (5' 8\").    Weight as of this encounter: 84.8 kg (187 lb).       "

## 2024-08-19 NOTE — PRE-PROCEDURE
GENERAL PRE-PROCEDURE:   Procedure:  Coronary angiogram with possible PCI  Date/Time:  8/19/2024 8:04 AM    Written consent obtained?: Yes    Risks and benefits: Risks, benefits and alternatives were discussed    Consent given by:  Patient  Patient states understanding of procedure being performed: Yes    Patient's understanding of procedure matches consent: Yes    Procedure consent matches procedure scheduled: Yes    Expected level of sedation:  Moderate  Appropriately NPO:  Yes  ASA Class:  3 (Abnormal stress test, CAD, hypercholesterolemia, CHRIS; not on CPAP)  Mallampati  :  Grade 2- soft palate, base of uvula, tonsillar pillars, and portion of posterior pharyngeal wall visible  Lungs:  Lungs clear with good breath sounds bilaterally  Heart:  Other (comment)  Heart exam comment:  Chris  History & Physical reviewed:  History and physical reviewed and updates made (see comment)  H&P Comments:  Clinically Significant Risk Factors Present on Admission    Cardiovascular : Abnormal stress test, chest pain and dyspnea on exertion, hypercholesterolemia    Fluid & Electrolyte Disorders : Not present on admission    Gastroenterology : Not present on admission    Hematology/Oncology : Not present on admission    Nephrology : Not present on admission    Neurology : Not present on admission    Pulmonology : CHRIS; not on CPAP    Systemic : Not present on admission    Statement of review:  I have reviewed the lab findings, diagnostic data, medications, and the plan for sedation

## 2024-08-19 NOTE — DISCHARGE INSTRUCTIONS

## 2024-08-19 NOTE — Clinical Note
The first balloon was inserted into the circumflex and proximal circumflex.Max pressure = 12 alfonso. Total duration = 33 seconds.

## 2024-08-19 NOTE — Clinical Note
The first balloon was inserted into the circumflex and distal circumflex.Max pressure = 8 alfonso. Total duration = 15 seconds.

## 2024-08-19 NOTE — Clinical Note
Balloon removed intact. Read and agree with plan; order signed. Will notify Ms Davidson via [employer]

## 2024-08-19 NOTE — Clinical Note
The first balloon was inserted into the circumflex and proximal circumflex.Max pressure = 15 alfonso. Total duration = 6 seconds.     Max pressure = 8 alfonso. Total duration = 6 seconds.    Balloon reinflated a second time: Max pressure = 8 alfonso. Total duration = 6 seconds.

## 2024-08-19 NOTE — Clinical Note
The first balloon was inserted into the circumflex and proximal circumflex.Max pressure = 3 alfonso.     Max pressure = 2 alfonso.   Shockwave delivered.  Balloon reinflated a second time: Max pressure = 2 alfonso.Shockwave delivered

## 2024-08-19 NOTE — Clinical Note
Stent deployed in the middle right coronary artery. Max pressure = 14 alfonso. Total duration = 25 seconds.

## 2024-08-26 NOTE — PROGRESS NOTES
Assessment/Recommendations   Assessment:    1.  Coronary artery disease: His recent coronary angiogram showed 90% Ost Cx to Prox Cx, Prox RCA-mid RCA 85%, Prox Cx-Mid Cx 99%, Prox LAD- MID LAD 70%.  Status post intracoronary lithotripsy and DESx1 to proximal LCx, DESx1 to distal Lcx, PREETI x1 to RCA. No intervention to LAD lesion    - On dual antiplatelet therapy with ASA 81 mg indefinitely and Clopidogrel (Plavix) 75 mg daily for 12 months.  - He reports an improvement in his chest tightness and fatigue since the procedure.  - Cardiac rehab has been scheduled  - Reviewed most recent BMP, Hgb, platelet- stable.    2.  Dyslipidemia with LDL goal <70: Braden Aleman is on high intensity statin therapy with Lipitor milligrams daily. Most recent LDL is 137.          Plan:  - We discussed the importance of antiplatelet therapy and talking with his cardiologist prior to stopping these medications for any reason.  We discussed about utilization of as needed nitroglycerin.   - Encouraged to seek medical attention if recurrent chest pain or shortness of breath.  - MPI stress in 1 year. Consider PCI proximal LAD sooner if patient has persistence of anginal symptoms and/or anterior wall ischemia demonstrated on subsequent follow-up stress tests.     - Continue hyperlipidemia regimen. Fasting lipid profile check in 4-8 weeks. Order placed    - Cardiac rehab as scheduled/    - We discussed the importance of tightly controlled blood sugars to minimize risk of worsening coronary artery disease. Defer to PCP for diabetes managment.    - We discussed a diet low in saturated fat, weight loss, and exercise along with medication for better control of cholesterol.  Highly encouraged to participate in nutrition class in cardiac rehab.  - Risk factor modification and lifestyle management topics were discussed including managing comorbidities, weight loss, heart healthy diet, exercise, smoking cessation, stress reduction, and  alcohol use.        Follow up with Dr. Mercado October 10     History of Present Illness/Subjective    Mr. Braden Aleman is a 64 year old male with a past medical history of coronary artery disease, dyslipidemia who is seen at [facility] for post coronary intervention follow up.  He is accompanied by his wife for today's visit.    He has developed some fatigue over the past several years.  He had also noticed a chest pressure and tightness that radiated to the left side of his chest.  It was worse with exercise and would improve with rest.  He had recently noticed an increase in this.  He he was seen by his primary care provider for a stress test which was abnormal and he was referred to rapid access clinic.  He underwent subsequent coronary angiogram on 8/19/2024 which showed multivessel coronary artery disease.  He underwent successful PCI with intracoronary lithotripsy and PREETI x 1 to the proximal LCx and PREETI x1 to distal LCx.  He also underwent successful PCI with PREETI x 1 to the RCA.  There is residual proximal to mid LAD 70% stenosis.  Since the procedure patient reports feeling better although he has not fully exerted himself like he did prior to the procedure.  He denies having any chest tightness or pain like he did prior to the procedure.  He feels that his fatigue is better.  He denies shortness of breath, leg swelling, or palpitations since the procedure.  He does occasionally report feeling dizziness       Coronary Angiogram 8/19/2024 reviewed:    Ost Cx to Prox Cx lesion is 90% stenosed.    Prox RCA to Mid RCA lesion is 85% stenosed.    Prox Cx to Mid Cx lesion is 99% stenosed.    Prox LAD to Mid LAD lesion is 70% stenosed.     1.  Multivessel coronary disease as noted above.  (Stress MPI suggests inferolateral ischemia but no anterior ischemia.)  2.  Successful PCI proximal and distal circumflex with 3.5 x 30 Tadeo drug-eluting stent extending from proximal  "circumflex into large first OM branch, and 2.25 x 38 Dixon drug-eluting stent in mid to distal circumflex branch.  This branch is jailed by stent going into the first OM.  CLAUDIA-3 flow both branches.  Intimal disruption at distal end of 2.25 stent but flow remained normal.  No additional stent delivered.  Intracoronary lithotripsy was administered to the proximal circumflex and ostium of large first OM branch.  3.  Successful PCI RCA with 2.5 x 48 Synergy drug-eluting stent deployed from distal to proximal vessel.  4.  Recommend MPI stress in 1 year.  Consider PCI proximal LAD sooner if  patient has persistence of anginal symptoms and/or anterior wall ischemia demonstrated on subsequent follow-up stress tests.     Stress test 8/1/2024 reviewed    The nuclear stress test is abnormal.    There is a medium sized area of mild ischemia in the entire inferolateral segment(s) of the left ventricle.    There is 2.0 mm of horizontal ST segment depression in the II, III, aVF, V4, V5 and V6 leads    The patient reported chest discomfort during the stress test.  The onset of symptoms occurred at stage 3 of the protocol.    The left ventricular ejection fraction at rest is 70%.    The patient is at an intermediate risk of future cardiac ischemic events.    The stress electrocardiogram was abnormal.    There is no prior study for comparison.         Physical Examination Review of Systems   /64 (BP Location: Right arm, Patient Position: Sitting, Cuff Size: Adult Regular)   Pulse 56   Resp 18   Ht 1.727 m (5' 8\")   Wt 87.5 kg (193 lb)   SpO2 97%   BMI 29.35 kg/m    Body mass index is 29.35 kg/m .  Wt Readings from Last 3 Encounters:   08/27/24 87.5 kg (193 lb)   08/19/24 84.8 kg (187 lb)   08/16/24 85.3 kg (188 lb)     General Appearance:   no distress, normal body habitus   ENT/Mouth: membranes moist, no oral lesions or bleeding gums.      EYES:  no scleral icterus, normal conjunctivae   Neck: no carotid bruits or " thyromegaly   Chest/Lungs:   lungs are clear to auscultation, no rales or wheezing, equal chest wall expansion    Cardiovascular:   Regular. Normal first and second heart sounds with no murmurs, rubs, or gallops; the carotid, radial and posterior tibial pulses are intact,  no edema bilaterally    Abdomen:  no organomegaly, masses, bruits, or tenderness; bowel sounds are present   Extremities  Puncture Site: no cyanosis or clubbing  Right radial site is soft with no bruising.  Radial pulses intact and symmetrical.  CMS intact.   Skin: no xanthelasma, warm.    Neurologic: normal  bilateral, no tremors     Psychiatric: alert and oriented x3, calm                                                        Negative unless noted in HPI     Medical History  Surgical History Family History Social History   Past Medical History:   Diagnosis Date    Kidney stones     Past Surgical History:   Procedure Laterality Date    ARTHROSCOPY SHOULDER ROTATOR CUFF REPAIR Bilateral     COLONOSCOPY  2010    CV CORONARY ANGIOGRAM N/A 8/19/2024    Procedure: Coronary Angiogram;  Surgeon: Shubham Dinh MD;  Location: [facility]    CV LEFT HEART CATH N/A 8/19/2024    Procedure: Left Heart Catheterization;  Surgeon: Shubham Dinh MD;  Location: [facility]    CV PCI N/A 8/19/2024    Procedure: Percutaneous Coronary Intervention;  Surgeon: Shubham Dinh MD;  Location: [facility]    EYE SURGERY  1985    Over 30 years ago    Family History   Problem Relation Age of Onset    Breast Cancer Mother     Multiple Sclerosis Mother     Depression Sister     Multiple Sclerosis Sister     No Known Problems Father     Social History     Socioeconomic History    Marital status:      Spouse name: Not on file    Number of children: Not on file    Years of education: Not on file    Highest education level: Not on file   Occupational History    Not on file   Tobacco Use    Smoking status: Never     Passive  exposure: Yes    Smokeless tobacco: Never    Tobacco comments:     spouse outside   Substance and Sexual Activity    Alcohol use: Yes     Comment: Alcoholic Drinks/day: 2-4 drinks per week ( a Cooler)    Drug use: Not Currently     Types: Marijuana    Sexual activity: Yes     Partners: Female     Birth control/protection: None   Other Topics Concern    Parent/sibling w/ CABG, MI or angioplasty before 65F 55M? No   Social History Narrative    Works at the [employer].  Patient is  and lives with his wife.  Plays [employer] baseball.    Feliberto Gilman MD    Wife Marianne has been diagnosed with lung cancer and going for surgery September.  She is a smoker.        The 10-year ASCVD risk score (Avery ERWIN, et al., 2019) is: 8.8%      Values used to calculate the score:        Age: 63 years        Sex: Male        Is Non- : No        Diabetic: No        Tobacco smoker: No        Systolic Blood Pressure: 118 mmHg        Is BP treated: No        HDL Cholesterol: 69 mg/dL        Total Cholesterol: 210 mg/dL         Social Determinants of Health     Financial Resource Strain: Low Risk  (7/28/2024)    Financial Resource Strain     Within the past 12 months, have you or your family members you live with been unable to get utilities (heat, electricity) when it was really needed?: No   Food Insecurity: Low Risk  (7/28/2024)    Food Insecurity     Within the past 12 months, did you worry that your food would run out before you got money to buy more?: No     Within the past 12 months, did the food you bought just not last and you didn t have money to get more?: No   Transportation Needs: Low Risk  (7/28/2024)    Transportation Needs     Within the past 12 months, has lack of transportation kept you from medical appointments, getting your medicines, non-medical meetings or appointments, work, or from getting things that you need?: No   Physical Activity: Sufficiently Active (7/28/2024)    Exercise  Vital Sign     Days of Exercise per Week: 4 days     Minutes of Exercise per Session: 60 min   Stress: No Stress Concern Present (7/28/2024)    [place] - Occupational Stress Questionnaire     Feeling of Stress : Not at all   Social Connections: Unknown (7/28/2024)    Social Connection and Isolation Panel [NHANES]     Frequency of Communication with Friends and Family: Not on file     Frequency of Social Gatherings with Friends and Family: Once a week     Attends Nondenominational Services: Not on file     Active Member of Clubs or Organizations: Not on file     Attends Club or Organization Meetings: Not on file     Marital Status: Not on file   Interpersonal Safety: Low Risk  (7/29/2024)    Interpersonal Safety     Do you feel physically and emotionally safe where you currently live?: Yes     Within the past 12 months, have you been hit, slapped, kicked or otherwise physically hurt by someone?: No     Within the past 12 months, have you been humiliated or emotionally abused in other ways by your partner or ex-partner?: No   Housing Stability: Low Risk  (7/28/2024)    Housing Stability     Do you have housing? : Yes     Are you worried about losing your housing?: No          Medications  Allergies   Current Outpatient Medications   Medication Sig Dispense Refill    aspirin 81 MG EC tablet Take 81 mg by mouth daily      atorvastatin (LIPITOR) 80 MG tablet Take 1 tablet (80 mg) by mouth daily 90 tablet 3    b complex vitamins tablet [B COMPLEX VITAMINS TABLET] Take 1 tablet by mouth daily.      clopidogrel (PLAVIX) 75 MG tablet Take 1 tablet (75 mg) by mouth daily 90 tablet 3    Coenzyme Q10 (CO Q 10 PO) Take 20 mg by mouth daily      glucosamine-chondroitin (COSAMIN DS) 500-400 MG tablet Take 2 tablets by mouth daily      multivitamin capsule [MULTIVITAMIN CAPSULE] Take 1 capsule by mouth daily.      ibuprofen (ADVIL/MOTRIN) 200 MG capsule Take 200 mg by mouth every 4 hours as needed for fever  (Patient not taking: Reported on 8/27/2024)      No Known Allergies      Lab Results    Chemistry/lipid CBC Cardiac Enzymes/BNP/TSH/INR   Lab Results   Component Value Date    CHOL 217 (H) 07/29/2024    HDL 70 07/29/2024    TRIG 52 07/29/2024    BUN 15.4 08/19/2024     08/19/2024    CO2 26 08/19/2024    Lab Results   Component Value Date    WBC 4.6 08/19/2024    HGB 14.8 08/19/2024    HCT 44.3 08/19/2024    MCV 89 08/19/2024     08/19/2024    Lab Results   Component Value Date    TSH 1.11 10/13/2023        30 minutes spent on the date of encounter doing chart review, review of test results, interpretation with above tests, patient visit, documentation, and discussion with family.        This note has been dictated using voice recognition software. Any grammatical, typographical, or context distortions are unintentional and inherent to the software    Indira Valerio PA-C

## 2024-08-27 ENCOUNTER — HOSPITAL ENCOUNTER (OUTPATIENT)
Dept: CARDIAC REHAB | Facility: HOSPITAL | Age: 64
Discharge: HOME | End: 2024-08-27
Attending: INTERNAL MEDICINE
Payer: COMMERCIAL

## 2024-08-27 ENCOUNTER — OFFICE VISIT (OUTPATIENT)
Dept: CARDIOLOGY | Facility: CLINIC | Age: 64
End: 2024-08-27
Payer: MEDICARE

## 2024-08-27 VITALS
WEIGHT: 193 LBS | HEIGHT: 68 IN | SYSTOLIC BLOOD PRESSURE: 116 MMHG | DIASTOLIC BLOOD PRESSURE: 64 MMHG | BODY MASS INDEX: 29.25 KG/M2 | HEART RATE: 56 BPM | RESPIRATION RATE: 18 BRPM | OXYGEN SATURATION: 97 %

## 2024-08-27 DIAGNOSIS — I25.83 CORONARY ARTERIOSCLEROSIS DUE TO LIPID RICH PLAQUE: ICD-10-CM

## 2024-08-27 DIAGNOSIS — Z98.890 STATUS POST CORONARY ANGIOGRAM: Primary | ICD-10-CM

## 2024-08-27 DIAGNOSIS — Z95.5 S/P RIGHT CORONARY ARTERY (RCA) STENT PLACEMENT: ICD-10-CM

## 2024-08-27 PROCEDURE — 93797 PHYS/QHP OP CAR RHAB WO ECG: CPT

## 2024-08-27 PROCEDURE — 93798 PHYS/QHP OP CAR RHAB W/ECG: CPT

## 2024-08-27 PROCEDURE — 99214 OFFICE O/P EST MOD 30 MIN: CPT | Performed by: PHYSICIAN ASSISTANT

## 2024-08-27 RX ORDER — NITROGLYCERIN 0.4 MG/1
TABLET SUBLINGUAL
Qty: 10 TABLET | Refills: 0 | Status: SHIPPED | OUTPATIENT
Start: 2024-08-27

## 2024-08-27 NOTE — LETTER
8/27/2024    Feliberto Gilman MD  [address]  [facility] [address]    RE: Braden Aleman       Dear Colleague,     I had the pleasure of seeing Braden Aleman in the [facility].          Assessment/Recommendations   Assessment:    1.  Coronary artery disease: His recent coronary angiogram showed 90% Ost Cx to Prox Cx, Prox RCA-mid RCA 85%, Prox Cx-Mid Cx 99%, Prox LAD- MID LAD 70%.  Status post intracoronary lithotripsy and DESx1 to proximal LCx, DESx1 to distal Lcx, PREETI x1 to RCA. No intervention to LAD lesion    - On dual antiplatelet therapy with ASA 81 mg indefinitely and Clopidogrel (Plavix) 75 mg daily for 12 months.  - He reports an improvement in his chest tightness and fatigue since the procedure.  - Cardiac rehab has been scheduled  - Reviewed most recent BMP, Hgb, platelet- stable.    2.  Dyslipidemia with LDL goal <70: Braden Aleman is on high intensity statin therapy with Lipitor milligrams daily. Most recent LDL is 137.          Plan:  - We discussed the importance of antiplatelet therapy and talking with his cardiologist prior to stopping these medications for any reason.  We discussed about utilization of as needed nitroglycerin.   - Encouraged to seek medical attention if recurrent chest pain or shortness of breath.  - MPI stress in 1 year. Consider PCI proximal LAD sooner if patient has persistence of anginal symptoms and/or anterior wall ischemia demonstrated on subsequent follow-up stress tests.     - Continue hyperlipidemia regimen. Fasting lipid profile check in 4-8 weeks. Order placed    - Cardiac rehab as scheduled/    - We discussed the importance of tightly controlled blood sugars to minimize risk of worsening coronary artery disease. Defer to PCP for diabetes managment.    - We discussed a diet low in saturated fat, weight loss, and exercise along with medication for better control of cholesterol.  Highly encouraged to participate in nutrition class in cardiac  rehab.  - Risk factor modification and lifestyle management topics were discussed including managing comorbidities, weight loss, heart healthy diet, exercise, smoking cessation, stress reduction, and alcohol use.        Follow up with Dr. Mercado October 10     History of Present Illness/Subjective    Mr. Braden Aleman is a 64 year old male with a past medical history of coronary artery disease, dyslipidemia who is seen at [facility] for post coronary intervention follow up.  He is accompanied by his wife for today's visit.    He has developed some fatigue over the past several years.  He had also noticed a chest pressure and tightness that radiated to the left side of his chest.  It was worse with exercise and would improve with rest.  He had recently noticed an increase in this.  He he was seen by his primary care provider for a stress test which was abnormal and he was referred to rapid access clinic.  He underwent subsequent coronary angiogram on 8/19/2024 which showed multivessel coronary artery disease.  He underwent successful PCI with intracoronary lithotripsy and PREETI x 1 to the proximal LCx and PREETI x1 to distal LCx.  He also underwent successful PCI with PREETI x 1 to the RCA.  There is residual proximal to mid LAD 70% stenosis.  Since the procedure patient reports feeling better although he has not fully exerted himself like he did prior to the procedure.  He denies having any chest tightness or pain like he did prior to the procedure.  He feels that his fatigue is better.  He denies shortness of breath, leg swelling, or palpitations since the procedure.  He does occasionally report feeling dizziness       Coronary Angiogram 8/19/2024 reviewed:     Ost Cx to Prox Cx lesion is 90% stenosed.     Prox RCA to Mid RCA lesion is 85% stenosed.     Prox Cx to Mid Cx lesion is 99% stenosed.     Prox LAD to Mid LAD lesion is 70% stenosed.     1.  Multivessel coronary disease as  "noted above.  (Stress MPI suggests inferolateral ischemia but no anterior ischemia.)  2.  Successful PCI proximal and distal circumflex with 3.5 x 30 Tadeo drug-eluting stent extending from proximal circumflex into large first OM branch, and 2.25 x 38 [place] drug-eluting stent in mid to distal circumflex branch.  This branch is jailed by stent going into the first OM.  CLAUDIA-3 flow both branches.  Intimal disruption at distal end of 2.25 stent but flow remained normal.  No additional stent delivered.  Intracoronary lithotripsy was administered to the proximal circumflex and ostium of large first OM branch.  3.  Successful PCI RCA with 2.5 x 48 Synergy drug-eluting stent deployed from distal to proximal vessel.  4.  Recommend MPI stress in 1 year.  Consider PCI proximal LAD sooner if  patient has persistence of anginal symptoms and/or anterior wall ischemia demonstrated on subsequent follow-up stress tests.     Stress test 8/1/2024 reviewed     The nuclear stress test is abnormal.     There is a medium sized area of mild ischemia in the entire inferolateral segment(s) of the left ventricle.     There is 2.0 mm of horizontal ST segment depression in the II, III, aVF, V4, V5 and V6 leads     The patient reported chest discomfort during the stress test.  The onset of symptoms occurred at stage 3 of the protocol.     The left ventricular ejection fraction at rest is 70%.     The patient is at an intermediate risk of future cardiac ischemic events.     The stress electrocardiogram was abnormal.     There is no prior study for comparison.         Physical Examination Review of Systems   /64 (BP Location: Right arm, Patient Position: Sitting, Cuff Size: Adult Regular)   Pulse 56   Resp 18   Ht 1.727 m (5' 8\")   Wt 87.5 kg (193 lb)   SpO2 97%   BMI 29.35 kg/m    Body mass index is 29.35 kg/m .  Wt Readings from Last 3 Encounters:   08/27/24 87.5 kg (193 lb)   08/19/24 84.8 kg (187 lb)   08/16/24 85.3 kg (188 lb) "     General Appearance:   no distress, normal body habitus   ENT/Mouth: membranes moist, no oral lesions or bleeding gums.      EYES:  no scleral icterus, normal conjunctivae   Neck: no carotid bruits or thyromegaly   Chest/Lungs:   lungs are clear to auscultation, no rales or wheezing, equal chest wall expansion    Cardiovascular:   Regular. Normal first and second heart sounds with no murmurs, rubs, or gallops; the carotid, radial and posterior tibial pulses are intact,  no edema bilaterally    Abdomen:  no organomegaly, masses, bruits, or tenderness; bowel sounds are present   Extremities  Puncture Site: no cyanosis or clubbing  Right radial site is soft with no bruising.  Radial pulses intact and symmetrical.  CMS intact.   Skin: no xanthelasma, warm.    Neurologic: normal  bilateral, no tremors     Psychiatric: alert and oriented x3, calm                                                        Negative unless noted in HPI     Medical History  Surgical History Family History Social History   Past Medical History:   Diagnosis Date     Kidney stones     Past Surgical History:   Procedure Laterality Date     ARTHROSCOPY SHOULDER ROTATOR CUFF REPAIR Bilateral      COLONOSCOPY  2010     CV CORONARY ANGIOGRAM N/A 8/19/2024    Procedure: Coronary Angiogram;  Surgeon: Shubham Dinh MD;  Location: [facility] CV     CV LEFT HEART CATH N/A 8/19/2024    Procedure: Left Heart Catheterization;  Surgeon: Shubham Dinh MD;  Location: [facility] CV     CV PCI N/A 8/19/2024    Procedure: Percutaneous Coronary Intervention;  Surgeon: Shubham Dinh MD;  Location: [facility] CV     EYE SURGERY  1985    Over 30 years ago    Family History   Problem Relation Age of Onset     Breast Cancer Mother      Multiple Sclerosis Mother      Depression Sister      Multiple Sclerosis Sister      No Known Problems Father     Social History     Socioeconomic History     Marital status:      Spouse name:  Not on file     Number of children: Not on file     Years of education: Not on file     Highest education level: Not on file   Occupational History     Not on file   Tobacco Use     Smoking status: Never     Passive exposure: Yes     Smokeless tobacco: Never     Tobacco comments:     spouse outside   Substance and Sexual Activity     Alcohol use: Yes     Comment: Alcoholic Drinks/day: 2-4 drinks per week ( a Cooler)     Drug use: Not Currently     Types: Marijuana     Sexual activity: Yes     Partners: Female     Birth control/protection: None   Other Topics Concern     Parent/sibling w/ CABG, MI or angioplasty before 65F 55M? No   Social History Narrative    Works at the [employer].  Patient is  and lives with his wife.  Plays [employer] baseball.    Feliberto Gilman MD    Wife Marianne has been diagnosed with lung cancer and going for surgery September.  She is a smoker.        The 10-year ASCVD risk score (Avery ERWIN, et al., 2019) is: 8.8%      Values used to calculate the score:        Age: 63 years        Sex: Male        Is Non- : No        Diabetic: No        Tobacco smoker: No        Systolic Blood Pressure: 118 mmHg        Is BP treated: No        HDL Cholesterol: 69 mg/dL        Total Cholesterol: 210 mg/dL         Social Determinants of Health     Financial Resource Strain: Low Risk  (7/28/2024)    Financial Resource Strain      Within the past 12 months, have you or your family members you live with been unable to get utilities (heat, electricity) when it was really needed?: No   Food Insecurity: Low Risk  (7/28/2024)    Food Insecurity      Within the past 12 months, did you worry that your food would run out before you got money to buy more?: No      Within the past 12 months, did the food you bought just not last and you didn t have money to get more?: No   Transportation Needs: Low Risk  (7/28/2024)    Transportation Needs      Within the past 12 months, has lack of  transportation kept you from medical appointments, getting your medicines, non-medical meetings or appointments, work, or from getting things that you need?: No   Physical Activity: Sufficiently Active (7/28/2024)    Exercise Vital Sign      Days of Exercise per Week: 4 days      Minutes of Exercise per Session: 60 min   Stress: No Stress Concern Present (7/28/2024)    [employer] [place] [employer] - Occupational Stress Questionnaire      Feeling of Stress : Not at all   Social Connections: Unknown (7/28/2024)    Social Connection and Isolation Panel [NHANES]      Frequency of Communication with Friends and Family: Not on file      Frequency of Social Gatherings with Friends and Family: Once a week      Attends Mu-ism Services: Not on file      Active Member of Clubs or Organizations: Not on file      Attends Club or Organization Meetings: Not on file      Marital Status: Not on file   Interpersonal Safety: Low Risk  (7/29/2024)    Interpersonal Safety      Do you feel physically and emotionally safe where you currently live?: Yes      Within the past 12 months, have you been hit, slapped, kicked or otherwise physically hurt by someone?: No      Within the past 12 months, have you been humiliated or emotionally abused in other ways by your partner or ex-partner?: No   Housing Stability: Low Risk  (7/28/2024)    Housing Stability      Do you have housing? : Yes      Are you worried about losing your housing?: No          Medications  Allergies   Current Outpatient Medications   Medication Sig Dispense Refill     aspirin 81 MG EC tablet Take 81 mg by mouth daily       atorvastatin (LIPITOR) 80 MG tablet Take 1 tablet (80 mg) by mouth daily 90 tablet 3     b complex vitamins tablet [B COMPLEX VITAMINS TABLET] Take 1 tablet by mouth daily.       clopidogrel (PLAVIX) 75 MG tablet Take 1 tablet (75 mg) by mouth daily 90 tablet 3     Coenzyme Q10 (CO Q 10 PO) Take 20 mg by mouth daily        glucosamine-chondroitin (COSAMIN DS) 500-400 MG tablet Take 2 tablets by mouth daily       multivitamin capsule [MULTIVITAMIN CAPSULE] Take 1 capsule by mouth daily.       ibuprofen (ADVIL/MOTRIN) 200 MG capsule Take 200 mg by mouth every 4 hours as needed for fever (Patient not taking: Reported on 8/27/2024)      No Known Allergies      Lab Results    Chemistry/lipid CBC Cardiac Enzymes/BNP/TSH/INR   Lab Results   Component Value Date    CHOL 217 (H) 07/29/2024    HDL 70 07/29/2024    TRIG 52 07/29/2024    BUN 15.4 08/19/2024     08/19/2024    CO2 26 08/19/2024    Lab Results   Component Value Date    WBC 4.6 08/19/2024    HGB 14.8 08/19/2024    HCT 44.3 08/19/2024    MCV 89 08/19/2024     08/19/2024    Lab Results   Component Value Date    TSH 1.11 10/13/2023        30 minutes spent on the date of encounter doing chart review, review of test results, interpretation with above tests, patient visit, documentation, and discussion with family.        This note has been dictated using voice recognition software. Any grammatical, typographical, or context distortions are unintentional and inherent to the software    Indira Valerio PA-C       Thank you for allowing me to participate in the care of your patient.      Sincerely,     Indira Valerio PA-C     [facility]  cc:   No referring provider defined for this encounter.

## 2024-08-27 NOTE — PATIENT INSTRUCTIONS
Braden Aleman,    It was a pleasure to see you today in the clinic regarding your follow-up after PCI.     My recommendations after this visit include:     - Continue aspirin 81 mg daily indefinitely and Plavix for at least 12 months. Do not stop these medications without speaking to your cardiologist  -  No other medication changes  - repeat fasting cholesterol labs mid-Septemer  - attend cardiac rehab/participate in regular exercise  - heart healthy diet    You should followup with Dr. Mercado October 10 as scheduled, or sooner if needed       If you have questions or concerns, please call using the numbers below:      After Hours/Scheduling  [phone]    Indira Valerio PA-C  Structural Heart Program  [facility]

## 2024-08-30 ENCOUNTER — HOSPITAL ENCOUNTER (OUTPATIENT)
Dept: CARDIAC REHAB | Facility: HOSPITAL | Age: 64
Discharge: HOME | End: 2024-08-30
Attending: INTERNAL MEDICINE
Payer: COMMERCIAL

## 2024-08-30 PROCEDURE — 93798 PHYS/QHP OP CAR RHAB W/ECG: CPT

## 2024-09-09 ENCOUNTER — HOSPITAL ENCOUNTER (OUTPATIENT)
Dept: CARDIAC REHAB | Facility: HOSPITAL | Age: 64
Discharge: HOME | End: 2024-09-09
Attending: INTERNAL MEDICINE
Payer: COMMERCIAL

## 2024-09-09 PROCEDURE — 93798 PHYS/QHP OP CAR RHAB W/ECG: CPT

## 2024-09-11 ENCOUNTER — HOSPITAL ENCOUNTER (OUTPATIENT)
Dept: CARDIAC REHAB | Facility: HOSPITAL | Age: 64
Discharge: HOME | End: 2024-09-11
Attending: INTERNAL MEDICINE
Payer: COMMERCIAL

## 2024-09-11 PROCEDURE — 93798 PHYS/QHP OP CAR RHAB W/ECG: CPT

## 2024-09-13 ENCOUNTER — HOSPITAL ENCOUNTER (OUTPATIENT)
Dept: CARDIAC REHAB | Facility: HOSPITAL | Age: 64
Discharge: HOME | End: 2024-09-13
Attending: INTERNAL MEDICINE
Payer: COMMERCIAL

## 2024-09-13 PROCEDURE — 93798 PHYS/QHP OP CAR RHAB W/ECG: CPT

## 2024-09-16 ENCOUNTER — HOSPITAL ENCOUNTER (OUTPATIENT)
Dept: CARDIAC REHAB | Facility: HOSPITAL | Age: 64
Discharge: HOME | End: 2024-09-16
Attending: INTERNAL MEDICINE
Payer: COMMERCIAL

## 2024-09-16 PROCEDURE — 93798 PHYS/QHP OP CAR RHAB W/ECG: CPT

## 2024-09-18 ENCOUNTER — HOSPITAL ENCOUNTER (OUTPATIENT)
Dept: CARDIAC REHAB | Facility: HOSPITAL | Age: 64
Discharge: HOME | End: 2024-09-18
Attending: INTERNAL MEDICINE
Payer: COMMERCIAL

## 2024-09-18 PROCEDURE — 93798 PHYS/QHP OP CAR RHAB W/ECG: CPT

## 2024-09-20 ENCOUNTER — HOSPITAL ENCOUNTER (OUTPATIENT)
Dept: CARDIAC REHAB | Facility: HOSPITAL | Age: 64
Discharge: HOME | End: 2024-09-20
Attending: INTERNAL MEDICINE
Payer: COMMERCIAL

## 2024-09-20 PROCEDURE — 93798 PHYS/QHP OP CAR RHAB W/ECG: CPT

## 2024-09-23 ENCOUNTER — HOSPITAL ENCOUNTER (OUTPATIENT)
Dept: CARDIAC REHAB | Facility: HOSPITAL | Age: 64
Discharge: HOME | End: 2024-09-23
Attending: INTERNAL MEDICINE
Payer: COMMERCIAL

## 2024-09-23 PROCEDURE — 93798 PHYS/QHP OP CAR RHAB W/ECG: CPT

## 2024-09-25 ENCOUNTER — HOSPITAL ENCOUNTER (OUTPATIENT)
Dept: CARDIAC REHAB | Facility: HOSPITAL | Age: 64
Discharge: HOME | End: 2024-09-25
Attending: INTERNAL MEDICINE
Payer: COMMERCIAL

## 2024-09-25 PROCEDURE — 93798 PHYS/QHP OP CAR RHAB W/ECG: CPT

## 2024-09-27 ENCOUNTER — HOSPITAL ENCOUNTER (OUTPATIENT)
Dept: CARDIAC REHAB | Facility: HOSPITAL | Age: 64
Discharge: HOME | End: 2024-09-27
Attending: INTERNAL MEDICINE
Payer: COMMERCIAL

## 2024-09-27 PROCEDURE — 93798 PHYS/QHP OP CAR RHAB W/ECG: CPT

## 2024-09-30 ENCOUNTER — HOSPITAL ENCOUNTER (OUTPATIENT)
Dept: CARDIAC REHAB | Facility: HOSPITAL | Age: 64
Discharge: HOME | End: 2024-09-30
Attending: INTERNAL MEDICINE
Payer: COMMERCIAL

## 2024-09-30 PROCEDURE — 93798 PHYS/QHP OP CAR RHAB W/ECG: CPT

## 2024-10-09 ENCOUNTER — HOSPITAL ENCOUNTER (OUTPATIENT)
Dept: CARDIAC REHAB | Facility: HOSPITAL | Age: 64
Discharge: HOME | End: 2024-10-09
Attending: INTERNAL MEDICINE
Payer: MEDICARE

## 2024-10-09 PROCEDURE — 93798 PHYS/QHP OP CAR RHAB W/ECG: CPT

## 2024-10-10 ENCOUNTER — OFFICE VISIT (OUTPATIENT)
Dept: CARDIOLOGY | Facility: CLINIC | Age: 64
End: 2024-10-10
Payer: MEDICARE

## 2024-10-10 VITALS
WEIGHT: 189 LBS | HEIGHT: 69 IN | DIASTOLIC BLOOD PRESSURE: 74 MMHG | HEART RATE: 54 BPM | BODY MASS INDEX: 27.99 KG/M2 | RESPIRATION RATE: 16 BRPM | SYSTOLIC BLOOD PRESSURE: 122 MMHG

## 2024-10-10 DIAGNOSIS — I25.83 CORONARY ARTERIOSCLEROSIS DUE TO LIPID RICH PLAQUE: Primary | ICD-10-CM

## 2024-10-10 DIAGNOSIS — E78.00 PURE HYPERCHOLESTEROLEMIA: ICD-10-CM

## 2024-10-10 DIAGNOSIS — N40.0 BENIGN PROSTATIC HYPERPLASIA WITHOUT LOWER URINARY TRACT SYMPTOMS: ICD-10-CM

## 2024-10-10 DIAGNOSIS — R29.898 TRANSIENT LEFT LEG WEAKNESS: ICD-10-CM

## 2024-10-10 PROBLEM — Z98.890 STATUS POST CORONARY ANGIOGRAM: Status: RESOLVED | Noted: 2024-08-19 | Resolved: 2024-10-10

## 2024-10-10 LAB
CHOLEST SERPL-MCNC: 131 MG/DL
FASTING STATUS PATIENT QL REPORTED: YES
HDLC SERPL-MCNC: 65 MG/DL
LDLC SERPL CALC-MCNC: 52 MG/DL
NONHDLC SERPL-MCNC: 66 MG/DL
TRIGL SERPL-MCNC: 72 MG/DL

## 2024-10-10 PROCEDURE — 36415 COLL VENOUS BLD VENIPUNCTURE: CPT | Performed by: INTERNAL MEDICINE

## 2024-10-10 PROCEDURE — 80061 LIPID PANEL: CPT | Performed by: INTERNAL MEDICINE

## 2024-10-10 PROCEDURE — 99214 OFFICE O/P EST MOD 30 MIN: CPT | Performed by: INTERNAL MEDICINE

## 2024-10-10 PROCEDURE — G2211 COMPLEX E/M VISIT ADD ON: HCPCS | Performed by: INTERNAL MEDICINE

## 2024-10-10 RX ORDER — NITROGLYCERIN 0.4 MG/1
TABLET SUBLINGUAL
Qty: 10 TABLET | Refills: 0 | Status: SHIPPED | OUTPATIENT
Start: 2024-10-10

## 2024-10-10 NOTE — PATIENT INSTRUCTIONS
Mr Braden Aleman,  I enjoyed visiting with you again today.  I am glad to hear you are doing well.  Per our conversation move the atorvastatin to the nighttime.  I will check fasting cholesterol today and let you know results.  We will plan on repeating stress test around the beginning of February and and see me thereafter.  Any issues in the interim please let me know.  Lastly, given his vague left leg issue I will check a head MRI and be in touch with results.    I will plan on seeing you February as mentioned above.  Raphael Mercado

## 2024-10-10 NOTE — LETTER
October 10, 2024    Braden Aleman  [address] [id number]    Dear ,    We are writing to inform you of your test results.  Nice to see you again, glad things are going well, here is your cholesterol, looks great, keep up the good work and continue the current medication.    Resulted Orders   Lipid panel reflex to direct LDL Fasting   Result Value Ref Range    Cholesterol 131 <200 mg/dL    Triglycerides 72 <150 mg/dL    Direct Measure HDL 65 >=40 mg/dL    LDL Cholesterol Calculated 52 <100 mg/dL    Non HDL Cholesterol 66 <130 mg/dL    Patient Fasting > 8hrs? Yes     Narrative    Cholesterol  Desirable: < 200 mg/dL    Triglycerides  Normal: < 150 mg/dL    Direct Measure HDL  Male: >= 40 mg/dL    LDL Cholesterol  Desirable: < 100 mg/dL  Above Desirable: 100 - 129 mg/dL     Non HDL Cholesterol  Desirable: < 130 mg/dL  Above Desirable: 130 - 159 mg/dL       If you have any questions or concerns, please call the clinic at the number listed above.       Sincerely,      Vi Mercado MD

## 2024-10-10 NOTE — LETTER
10/10/2024    Feliberto Gilman MD  [address]  [facility] [address]    RE: Braden Aleman       Dear Colleague,     I had the pleasure of seeing Braden Aleman in the [facility].      [facility]  Heart Care Clinic Follow-up Note    Assessment & Plan        (I25.83) Coronary arteriosclerosis due to lipid rich plaque  (primary encounter diagnosis)  Comment: Given nuclear stress test showing mild ischemia involving the entire inferolateral segment of the left ventricle he underwent angiography August 19, 2024.  This showed normal left main, proximal to mid LAD 70% lesion which will be evaluated by stress test in February, ostial circumflex 90% lesion which received a 3.5 x 30 mm Tadeo drug-coated stent followed by a mid 99% lesion which received a 2.25 x 38 mm Tadeo drug-coated stent.  The right coronary artery had a proximal 85% lesion there received a 2.5 x 48 mm Synergy drug-coated stent.  Symptomatically improved, given LAD disease we will plan on stress testing in February and if abnormal proceed with angiography and stenting.  Clopidogrel for a year until August 2025.    (E78.00) Pure hypercholesterolemia  Comment: Unacceptable with a total of 212 and LDL of 137 and has been on the atorvastatin for 2 months and we will check fasting lipids today.    (N40.0) Benign prostatic hyperplasia without lower urinary tract symptoms  Comment: Now nocturia only once a night and defer to primary.    (R29.898) Transient left leg weakness  Comment: This is since the angiography, neurologic exam is unremarkable, we will check head MRI to make sure there has been no infarct following angiography.    Plan  1.  Fasting lipids.  2.  Continue cardiac rehab.  3.  Head MRI given left leg weakness.  4.  Repeat stress test around 6 months out from prior which will be February 2025 and follow-up with me thereafter.  5. Move atorvastatin to nighttime.    The longitudinal plan of care for the  "diagnosis(es)/condition(s) as documented were addressed during this visit. Due to the added complexity in care, I will continue to support Braden in the subsequent management and with ongoing continuity of care.     Subjective  CC: 64-year-old white gentleman being seen posthospital discharge follow-up.  He is back to being physically active doing alternating runs and sprints in the hills, states cardiac rehab is not as active for him.  He is dealing with his wife for his lung cancer at home.  He feels well other than some imbalance due to left leg weakness maybe 3% of the time.  No fatigue, syncope, dizziness, chest pains, palpitations, shortness breath, PND, orthopnea or peripheral edema.    Medications  Current Outpatient Medications   Medication Sig Dispense Refill     aspirin 81 MG EC tablet Take 81 mg by mouth daily       atorvastatin (LIPITOR) 80 MG tablet Take 1 tablet (80 mg) by mouth daily 90 tablet 3     b complex vitamins tablet [B COMPLEX VITAMINS TABLET] Take 1 tablet by mouth daily.       clopidogrel (PLAVIX) 75 MG tablet Take 1 tablet (75 mg) by mouth daily 90 tablet 3     Coenzyme Q10 (CO Q 10 PO) Take 20 mg by mouth daily       glucosamine-chondroitin (COSAMIN DS) 500-400 MG tablet Take 2 tablets by mouth daily       Ibuprofen-diphenhydrAMINE HCl 200-25 MG CAPS Take 1 tablet by mouth nightly as needed.       multivitamin capsule [MULTIVITAMIN CAPSULE] Take 1 capsule by mouth daily.       nitroGLYcerin (NITROSTAT) 0.4 MG sublingual tablet For chest pain place 1 tablet under the tongue every 5 minutes for 3 doses. If symptoms persist 5 minutes after 1st dose call 911. 10 tablet 0     ibuprofen (ADVIL/MOTRIN) 200 MG capsule Take 200 mg by mouth every 4 hours as needed for fever (Patient not taking: Reported on 8/27/2024)         Objective  /74 (BP Location: Left arm, Patient Position: Sitting, Cuff Size: Adult Regular)   Pulse 54   Resp 16   Ht 1.74 m (5' 8.5\")   Wt 85.7 kg (189 lb)   BMI " "28.32 kg/m      General Appearance:    Alert, cooperative, no distress, appears stated age   Head:    Normocephalic, without obvious abnormality, atraumatic   Throat:   Lips, mucosa, and tongue normal; teeth and gums normal   Neck:   Supple, symmetrical, trachea midline, no adenopathy;        thyroid:  No enlargement/tenderness/nodules; no carotid    bruit or JVD   Back:     Symmetric, no curvature, ROM normal, no CVA tenderness   Lungs:     Clear to auscultation bilaterally, respirations unlabored   Chest wall:    No tenderness or deformity   Heart:    Regular rate and rhythm, S1 and S2 normal, no murmur, rub   or gallop   Abdomen:     Soft, non-tender, bowel sounds active all four quadrants,     no masses, no organomegaly   Extremities:   Normal, atraumatic, no cyanosis or edema   Pulses:   2+ and symmetric all extremities   Skin:   Skin color, texture, turgor normal, no rashes or lesions     Results    Lab Results personally reviewed   Lab Results   Component Value Date    CHOL 217 (H) 07/29/2024    CHOL 210 (H) 04/07/2021     Lab Results   Component Value Date    HDL 70 07/29/2024    HDL 69 04/07/2021     No components found for: \"LDLCALC\"  Lab Results   Component Value Date    TRIG 52 07/29/2024    TRIG 62 04/07/2021     Lab Results   Component Value Date    WBC 4.6 08/19/2024    HGB 14.8 08/19/2024    HCT 44.3 08/19/2024     08/19/2024     Lab Results   Component Value Date    BUN 15.4 08/19/2024     08/19/2024    CO2 26 08/19/2024             Thank you for allowing me to participate in the care of your patient.      Sincerely,     ENMANUEL MERCADO MD     [facility]  cc:   Vi Mercado MD  [address], [address]  [place] [address]      "

## 2024-10-10 NOTE — PROGRESS NOTES
[facility]  Heart Care Clinic Follow-up Note    Assessment & Plan        (I25.83) Coronary arteriosclerosis due to lipid rich plaque  (primary encounter diagnosis)  Comment: Given nuclear stress test showing mild ischemia involving the entire inferolateral segment of the left ventricle he underwent angiography August 19, 2024.  This showed normal left main, proximal to mid LAD 70% lesion which will be evaluated by stress test in February, ostial circumflex 90% lesion which received a 3.5 x 30 mm Ta[place] drug-coated stent followed by a mid 99% lesion which received a 2.25 x 38 mm Tadeo drug-coated stent.  The right coronary artery had a proximal 85% lesion there received a 2.5 x 48 mm Synergy drug-coated stent.  Symptomatically improved, given LAD disease we will plan on stress testing in February and if abnormal proceed with angiography and stenting.  Clopidogrel for a year until August 2025.    (E78.00) Pure hypercholesterolemia  Comment: Unacceptable with a total of 212 and LDL of 137 and has been on the atorvastatin for 2 months and we will check fasting lipids today.    (N40.0) Benign prostatic hyperplasia without lower urinary tract symptoms  Comment: Now nocturia only once a night and defer to primary.    (R29.898) Transient left leg weakness  Comment: This is since the angiography, neurologic exam is unremarkable, we will check head MRI to make sure there has been no infarct following angiography.    Plan  1.  Fasting lipids.  2.  Continue cardiac rehab.  3.  Head MRI given left leg weakness.  4.  Repeat stress test around 6 months out from prior which will be February 2025 and follow-up with me thereafter.  5. Move atorvastatin to nighttime.    The longitudinal plan of care for the diagnosis(es)/condition(s) as documented were addressed during this visit. Due to the added complexity in care, I will continue to support Braden in the subsequent management and with ongoing continuity of care.  "    Subjective  CC: 64-year-old white gentleman being seen posthospital discharge follow-up.  He is back to being physically active doing alternating runs and sprints in the hills, states cardiac rehab is not as active for him.  He is dealing with his wife for his lung cancer at home.  He feels well other than some imbalance due to left leg weakness maybe 3% of the time.  No fatigue, syncope, dizziness, chest pains, palpitations, shortness breath, PND, orthopnea or peripheral edema.    Medications  Current Outpatient Medications   Medication Sig Dispense Refill    aspirin 81 MG EC tablet Take 81 mg by mouth daily      atorvastatin (LIPITOR) 80 MG tablet Take 1 tablet (80 mg) by mouth daily 90 tablet 3    b complex vitamins tablet [B COMPLEX VITAMINS TABLET] Take 1 tablet by mouth daily.      clopidogrel (PLAVIX) 75 MG tablet Take 1 tablet (75 mg) by mouth daily 90 tablet 3    Coenzyme Q10 (CO Q 10 PO) Take 20 mg by mouth daily      glucosamine-chondroitin (COSAMIN DS) 500-400 MG tablet Take 2 tablets by mouth daily      Ibuprofen-diphenhydrAMINE HCl 200-25 MG CAPS Take 1 tablet by mouth nightly as needed.      multivitamin capsule [MULTIVITAMIN CAPSULE] Take 1 capsule by mouth daily.      nitroGLYcerin (NITROSTAT) 0.4 MG sublingual tablet For chest pain place 1 tablet under the tongue every 5 minutes for 3 doses. If symptoms persist 5 minutes after 1st dose call 911. 10 tablet 0    ibuprofen (ADVIL/MOTRIN) 200 MG capsule Take 200 mg by mouth every 4 hours as needed for fever (Patient not taking: Reported on 8/27/2024)         Objective  /74 (BP Location: Left arm, Patient Position: Sitting, Cuff Size: Adult Regular)   Pulse 54   Resp 16   Ht 1.74 m (5' 8.5\")   Wt 85.7 kg (189 lb)   BMI 28.32 kg/m      General Appearance:    Alert, cooperative, no distress, appears stated age   Head:    Normocephalic, without obvious abnormality, atraumatic   Throat:   Lips, mucosa, and tongue normal; teeth and gums normal " "  Neck:   Supple, symmetrical, trachea midline, no adenopathy;        thyroid:  No enlargement/tenderness/nodules; no carotid    bruit or JVD   Back:     Symmetric, no curvature, ROM normal, no CVA tenderness   Lungs:     Clear to auscultation bilaterally, respirations unlabored   Chest wall:    No tenderness or deformity   Heart:    Regular rate and rhythm, S1 and S2 normal, no murmur, rub   or gallop   Abdomen:     Soft, non-tender, bowel sounds active all four quadrants,     no masses, no organomegaly   Extremities:   Normal, atraumatic, no cyanosis or edema   Pulses:   2+ and symmetric all extremities   Skin:   Skin color, texture, turgor normal, no rashes or lesions     Results    Lab Results personally reviewed   Lab Results   Component Value Date    CHOL 217 (H) 07/29/2024    CHOL 210 (H) 04/07/2021     Lab Results   Component Value Date    HDL 70 07/29/2024    HDL 69 04/07/2021     No components found for: \"LDLCALC\"  Lab Results   Component Value Date    TRIG 52 07/29/2024    TRIG 62 04/07/2021     Lab Results   Component Value Date    WBC 4.6 08/19/2024    HGB 14.8 08/19/2024    HCT 44.3 08/19/2024     08/19/2024     Lab Results   Component Value Date    BUN 15.4 08/19/2024     08/19/2024    CO2 26 08/19/2024         "

## 2024-10-29 ENCOUNTER — HOSPITAL ENCOUNTER (OUTPATIENT)
Dept: MRI IMAGING | Facility: HOSPITAL | Age: 64
Discharge: HOME | End: 2024-10-29
Attending: INTERNAL MEDICINE | Admitting: INTERNAL MEDICINE
Payer: COMMERCIAL

## 2024-10-29 DIAGNOSIS — R29.898 TRANSIENT LEFT LEG WEAKNESS: ICD-10-CM

## 2024-10-29 PROCEDURE — 70551 MRI BRAIN STEM W/O DYE: CPT | Mod: MG

## 2024-10-29 PROCEDURE — G1010 CDSM STANSON: HCPCS

## 2024-12-19 ENCOUNTER — MYC MEDICAL ADVICE (OUTPATIENT)
Dept: FAMILY MEDICINE | Facility: CLINIC | Age: 64
End: 2024-12-19
Payer: MEDICARE

## 2024-12-19 DIAGNOSIS — F41.8 PERFORMANCE ANXIETY: Primary | ICD-10-CM

## 2024-12-20 NOTE — TELEPHONE ENCOUNTER
Please review patient's request and let me know if I can order propranolol for him.  He uses for stage performance anxiety.    Feliberto Gilman MD

## 2024-12-23 RX ORDER — PROPRANOLOL HYDROCHLORIDE 10 MG/1
20 TABLET ORAL DAILY PRN
Qty: 60 TABLET | Refills: 1 | Status: SHIPPED | OUTPATIENT
Start: 2024-12-23

## 2025-02-05 ENCOUNTER — HOSPITAL ENCOUNTER (OUTPATIENT)
Dept: NUCLEAR MEDICINE | Facility: HOSPITAL | Age: 65
Discharge: HOME | End: 2025-02-05
Attending: INTERNAL MEDICINE
Payer: MEDICARE

## 2025-02-05 ENCOUNTER — HOSPITAL ENCOUNTER (OUTPATIENT)
Dept: CARDIOLOGY | Facility: HOSPITAL | Age: 65
Discharge: HOME | End: 2025-02-05
Attending: INTERNAL MEDICINE
Payer: MEDICARE

## 2025-02-05 DIAGNOSIS — E78.00 PURE HYPERCHOLESTEROLEMIA: ICD-10-CM

## 2025-02-05 DIAGNOSIS — I25.83 CORONARY ARTERIOSCLEROSIS DUE TO LIPID RICH PLAQUE: ICD-10-CM

## 2025-02-05 LAB
CV STRESS CURRENT BP HE: NORMAL
CV STRESS CURRENT HR HE: 100
CV STRESS CURRENT HR HE: 100
CV STRESS CURRENT HR HE: 101
CV STRESS CURRENT HR HE: 103
CV STRESS CURRENT HR HE: 109
CV STRESS CURRENT HR HE: 111
CV STRESS CURRENT HR HE: 113
CV STRESS CURRENT HR HE: 114
CV STRESS CURRENT HR HE: 116
CV STRESS CURRENT HR HE: 117
CV STRESS CURRENT HR HE: 126
CV STRESS CURRENT HR HE: 128
CV STRESS CURRENT HR HE: 132
CV STRESS CURRENT HR HE: 133
CV STRESS CURRENT HR HE: 135
CV STRESS CURRENT HR HE: 140
CV STRESS CURRENT HR HE: 141
CV STRESS CURRENT HR HE: 142
CV STRESS CURRENT HR HE: 144
CV STRESS CURRENT HR HE: 146
CV STRESS CURRENT HR HE: 60
CV STRESS CURRENT HR HE: 69
CV STRESS CURRENT HR HE: 72
CV STRESS CURRENT HR HE: 73
CV STRESS CURRENT HR HE: 73
CV STRESS CURRENT HR HE: 74
CV STRESS CURRENT HR HE: 74
CV STRESS CURRENT HR HE: 76
CV STRESS CURRENT HR HE: 77
CV STRESS CURRENT HR HE: 78
CV STRESS CURRENT HR HE: 80
CV STRESS CURRENT HR HE: 81
CV STRESS CURRENT HR HE: 81
CV STRESS CURRENT HR HE: 83
CV STRESS CURRENT HR HE: 84
CV STRESS CURRENT HR HE: 95
CV STRESS CURRENT HR HE: 96
CV STRESS CURRENT HR HE: 99
CV STRESS CURRENT HR HE: 99
CV STRESS DEVIATION TIME HE: NORMAL
CV STRESS ECHO PERCENT HR HE: NORMAL
CV STRESS EXERCISE STAGE HE: NORMAL
CV STRESS EXERCISE STAGE REACHED HE: NORMAL
CV STRESS FINAL RESTING BP HE: NORMAL
CV STRESS FINAL RESTING HR HE: 74
CV STRESS MAX HR HE: 146
CV STRESS MAX TREADMILL GRADE HE: 16
CV STRESS MAX TREADMILL SPEED HE: 4.2
CV STRESS PEAK DIA BP HE: NORMAL
CV STRESS PEAK SYS BP HE: NORMAL
CV STRESS PHASE HE: NORMAL
CV STRESS PROTOCOL HE: NORMAL
CV STRESS RESTING PT POSITION HE: NORMAL
CV STRESS RESTING PT POSITION HE: NORMAL
CV STRESS ST DEVIATION AMOUNT HE: NORMAL
CV STRESS ST DEVIATION ELEVATION HE: NORMAL
CV STRESS ST EVELATION AMOUNT HE: NORMAL
CV STRESS TEST TYPE HE: NORMAL
CV STRESS TOTAL STAGE TIME MIN 1 HE: NORMAL
RATE PRESSURE PRODUCT: NORMAL
STRESS ECHO BASELINE DIASTOLIC HE: 77
STRESS ECHO BASELINE HR: 69
STRESS ECHO BASELINE SYSTOLIC BP: 126
STRESS ECHO CALCULATED PERCENT HR: 94 %
STRESS ECHO LAST STRESS DIASTOLIC BP: 80
STRESS ECHO LAST STRESS HR: 146
STRESS ECHO LAST STRESS SYSTOLIC BP: 140
STRESS ECHO POST ESTIMATED WORKLOAD: 12.1
STRESS ECHO POST EXERCISE DUR MIN: 11
STRESS ECHO POST EXERCISE DUR SEC: 30
STRESS ECHO TARGET HR: 155

## 2025-02-05 PROCEDURE — 343N000001 HC RX 343 MED OP 636: Performed by: INTERNAL MEDICINE

## 2025-02-05 PROCEDURE — 93017 CV STRESS TEST TRACING ONLY: CPT

## 2025-02-05 PROCEDURE — 78452 HT MUSCLE IMAGE SPECT MULT: CPT | Mod: 26 | Performed by: INTERNAL MEDICINE

## 2025-02-05 PROCEDURE — 93018 CV STRESS TEST I&R ONLY: CPT | Performed by: INTERNAL MEDICINE

## 2025-02-05 PROCEDURE — A9500 TC99M SESTAMIBI: HCPCS | Performed by: INTERNAL MEDICINE

## 2025-02-05 PROCEDURE — 78452 HT MUSCLE IMAGE SPECT MULT: CPT

## 2025-02-05 PROCEDURE — 93016 CV STRESS TEST SUPVJ ONLY: CPT | Performed by: INTERNAL MEDICINE

## 2025-02-05 RX ADMIN — Medication 7.8 MILLICURIE: at 06:58

## 2025-02-05 RX ADMIN — Medication 30.6 MILLICURIE: at 08:39

## 2025-03-03 ENCOUNTER — OFFICE VISIT (OUTPATIENT)
Dept: CARDIOLOGY | Facility: CLINIC | Age: 65
End: 2025-03-03
Attending: INTERNAL MEDICINE
Payer: MEDICARE

## 2025-03-03 VITALS
BODY MASS INDEX: 29.67 KG/M2 | DIASTOLIC BLOOD PRESSURE: 60 MMHG | WEIGHT: 198 LBS | HEART RATE: 59 BPM | RESPIRATION RATE: 14 BRPM | SYSTOLIC BLOOD PRESSURE: 105 MMHG

## 2025-03-03 DIAGNOSIS — N40.0 BENIGN PROSTATIC HYPERPLASIA WITHOUT LOWER URINARY TRACT SYMPTOMS: ICD-10-CM

## 2025-03-03 DIAGNOSIS — I25.83 CORONARY ARTERIOSCLEROSIS DUE TO LIPID RICH PLAQUE: Primary | ICD-10-CM

## 2025-03-03 DIAGNOSIS — E78.00 PURE HYPERCHOLESTEROLEMIA: ICD-10-CM

## 2025-03-03 PROCEDURE — G2211 COMPLEX E/M VISIT ADD ON: HCPCS | Performed by: INTERNAL MEDICINE

## 2025-03-03 PROCEDURE — 3074F SYST BP LT 130 MM HG: CPT | Performed by: INTERNAL MEDICINE

## 2025-03-03 PROCEDURE — 99214 OFFICE O/P EST MOD 30 MIN: CPT | Performed by: INTERNAL MEDICINE

## 2025-03-03 PROCEDURE — 3078F DIAST BP <80 MM HG: CPT | Performed by: INTERNAL MEDICINE

## 2025-03-03 RX ORDER — CLOPIDOGREL BISULFATE 75 MG/1
75 TABLET ORAL DAILY
Qty: 90 TABLET | Refills: 2 | Status: SHIPPED | OUTPATIENT
Start: 2025-03-03

## 2025-03-03 NOTE — LETTER
3/3/2025    Feliberto Gilman MD  [address]  [facility] [address]    RE: Braden Aleman       Dear Colleague,     I had the pleasure of seeing Braden Aleman in the [facility].      [facility]  Heart Care Clinic Follow-up Note    Assessment & Plan     (I25.83) Coronary arteriosclerosis due to lipid rich plaque  (primary encounter diagnosis)  Comment: Given nuclear stress test showing mild ischemia involving the entire inferolateral segment of the left ventricle he underwent angiography August 19, 2024.  This showed normal left main, proximal to mid LAD 70% lesion which is not significant based on stress test in February, ostial circumflex 90% lesion which received a 3.5 x 30 mm [place] drug-coated stent followed by a mid 99% lesion which received a 2.25 x 38 mm Tadeo drug-coated stent.  The right coronary artery had a proximal 85% lesion that received a 2.5 x 48 mm Synergy drug-coated stent.  Symptomatically improved. Clopidogrel for a year until August 2025.     (E78.00) Pure hypercholesterolemia  Comment: Mood atorvastatin the evening, total cholesterol 131 with LDL of 52 which is excellent.  Continue current meds.    (N40.0) Benign prostatic hyperplasia without lower urinary tract symptoms  Comment: Now nocturia only once a night and defer to primary, on no meds ct symptoms    Plan  1.  Continue current medications.  2.  Follow-up me around 6 months, year out from his stent at which point time we will discontinue Plavix, or sooner if needed.    The longitudinal plan of care for the diagnosis(es)/condition(s) as documented were addressed during this visit. Due to the added complexity in care, I will continue to support Braden in the subsequent management and with ongoing continuity of care.     Subjective  CC: 64-year-old white gentleman here for a 6-month follow-up.  Still living independently with his wife, still retired, trying to be physically active going fishing, playing  softball.  No complaints with syncope, presyncope, fatigue, chest pains, palpitations, shortness breath, PND, orthopnea or peripheral edema.    Medications  Current Outpatient Medications   Medication Sig Dispense Refill     aspirin 81 MG EC tablet Take 81 mg by mouth daily       atorvastatin (LIPITOR) 80 MG tablet Take 1 tablet (80 mg) by mouth daily 90 tablet 3     b complex vitamins tablet [B COMPLEX VITAMINS TABLET] Take 1 tablet by mouth daily.       clopidogrel (PLAVIX) 75 MG tablet Take 1 tablet (75 mg) by mouth daily. 90 tablet 2     Coenzyme Q10 (CO Q 10 PO) Take 20 mg by mouth daily       glucosamine-chondroitin (COSAMIN DS) 500-400 MG tablet Take 2 tablets by mouth daily       Ibuprofen-diphenhydrAMINE HCl 200-25 MG CAPS Take 1 tablet by mouth nightly as needed.       multivitamin capsule [MULTIVITAMIN CAPSULE] Take 1 capsule by mouth daily.       nitroGLYcerin (NITROSTAT) 0.4 MG sublingual tablet For chest pain place 1 tablet under the tongue every 5 minutes for 3 doses. If symptoms persist 5 minutes after 1st dose call 911. 10 tablet 0     propranolol (INDERAL) 10 MG tablet Take 2 tablets (20 mg) by mouth daily as needed (For performance anxiety). 10 to 20 mg orally 30 to 60 minutes prior to the anxiety-inducing situation 60 tablet 1     ibuprofen (ADVIL/MOTRIN) 200 MG capsule Take 200 mg by mouth every 4 hours as needed for fever (Patient not taking: Reported on 3/3/2025)         Objective  /60 (BP Location: Right arm, Patient Position: Sitting, Cuff Size: Adult Large)   Pulse 59   Resp 14   Wt 89.8 kg (198 lb)   BMI 29.67 kg/m      General Appearance:    Alert, cooperative, no distress, appears stated age   Head:    Normocephalic, without obvious abnormality, atraumatic   Throat:   Lips, mucosa, and tongue normal; teeth and gums normal   Neck:   Supple, symmetrical, trachea midline, no adenopathy;        thyroid:  No enlargement/tenderness/nodules; no carotid    bruit or JVD   Back:      "Symmetric, no curvature, ROM normal, no CVA tenderness   Lungs:     Clear to auscultation bilaterally, respirations unlabored   Chest wall:    No tenderness or deformity   Heart:    Regular rate and rhythm, S1 and S2 normal, no murmur, rub   or gallop   Abdomen:     Soft, non-tender, bowel sounds active all four quadrants,     no masses, no organomegaly   Extremities:   Normal, atraumatic, no cyanosis or edema   Pulses:   2+ and symmetric all extremities   Skin:   Skin color, texture, turgor normal, no rashes or lesions     Results    Lab Results personally reviewed   Lab Results   Component Value Date    CHOL 131 10/10/2024    CHOL 217 (H) 07/29/2024     Lab Results   Component Value Date    HDL 65 10/10/2024    HDL 70 07/29/2024     No components found for: \"LDLCALC\"  Lab Results   Component Value Date    TRIG 72 10/10/2024    TRIG 52 07/29/2024     Lab Results   Component Value Date    WBC 4.6 08/19/2024    HGB 14.8 08/19/2024    HCT 44.3 08/19/2024     08/19/2024     Lab Results   Component Value Date    BUN 15.4 08/19/2024     08/19/2024    CO2 26 08/19/2024             Thank you for allowing me to participate in the care of your patient.      Sincerely,     ENMANUEL MERCADO MD     [facility]  cc:   Vi Mercado MD  [address], [address]  [place] [address]      "

## 2025-03-03 NOTE — PATIENT INSTRUCTIONS
Mr Braden Aleman,  I enjoyed visiting with you again today.  I am glad to hear you are doing well.  Per our conversation I renewed the PLAVIX.  I will plan on seeing you August.  Raphael Mercado

## 2025-03-03 NOTE — PROGRESS NOTES
[facility]  Heart Care Clinic Follow-up Note    Assessment & Plan     (I25.83) Coronary arteriosclerosis due to lipid rich plaque  (primary encounter diagnosis)  Comment: Given nuclear stress test showing mild ischemia involving the entire inferolateral segment of the left ventricle he underwent angiography August 19, 2024.  This showed normal left main, proximal to mid LAD 70% lesion which is not significant based on stress test in February, ostial circumflex 90% lesion which received a 3.5 x 30 mm Cardinal drug-coated stent followed by a mid 99% lesion which received a 2.25 x 38 mm Tadeo drug-coated stent.  The right coronary artery had a proximal 85% lesion that received a 2.5 x 48 mm Synergy drug-coated stent.  Symptomatically improved. Clopidogrel for a year until August 2025.     (E78.00) Pure hypercholesterolemia  Comment: Mood atorvastatin the evening, total cholesterol 131 with LDL of 52 which is excellent.  Continue current meds.    (N40.0) Benign prostatic hyperplasia without lower urinary tract symptoms  Comment: Now nocturia only once a night and defer to primary, on no meds ct symptoms    Plan  1.  Continue current medications.  2.  Follow-up me around 6 months, year out from his stent at which point time we will discontinue Plavix, or sooner if needed.    The longitudinal plan of care for the diagnosis(es)/condition(s) as documented were addressed during this visit. Due to the added complexity in care, I will continue to support Braden in the subsequent management and with ongoing continuity of care.     Subjective  CC: 64-year-old white gentleman here for a 6-month follow-up.  Still living independently with his wife, still retired, trying to be physically active going fishing, playing softball.  No complaints with syncope, presyncope, fatigue, chest pains, palpitations, shortness breath, PND, orthopnea or peripheral edema.    Medications  Current Outpatient Medications   Medication Sig Dispense  Refill    aspirin 81 MG EC tablet Take 81 mg by mouth daily      atorvastatin (LIPITOR) 80 MG tablet Take 1 tablet (80 mg) by mouth daily 90 tablet 3    b complex vitamins tablet [B COMPLEX VITAMINS TABLET] Take 1 tablet by mouth daily.      clopidogrel (PLAVIX) 75 MG tablet Take 1 tablet (75 mg) by mouth daily. 90 tablet 2    Coenzyme Q10 (CO Q 10 PO) Take 20 mg by mouth daily      glucosamine-chondroitin (COSAMIN DS) 500-400 MG tablet Take 2 tablets by mouth daily      Ibuprofen-diphenhydrAMINE HCl 200-25 MG CAPS Take 1 tablet by mouth nightly as needed.      multivitamin capsule [MULTIVITAMIN CAPSULE] Take 1 capsule by mouth daily.      nitroGLYcerin (NITROSTAT) 0.4 MG sublingual tablet For chest pain place 1 tablet under the tongue every 5 minutes for 3 doses. If symptoms persist 5 minutes after 1st dose call 911. 10 tablet 0    propranolol (INDERAL) 10 MG tablet Take 2 tablets (20 mg) by mouth daily as needed (For performance anxiety). 10 to 20 mg orally 30 to 60 minutes prior to the anxiety-inducing situation 60 tablet 1    ibuprofen (ADVIL/MOTRIN) 200 MG capsule Take 200 mg by mouth every 4 hours as needed for fever (Patient not taking: Reported on 3/3/2025)         Objective  /60 (BP Location: Right arm, Patient Position: Sitting, Cuff Size: Adult Large)   Pulse 59   Resp 14   Wt 89.8 kg (198 lb)   BMI 29.67 kg/m      General Appearance:    Alert, cooperative, no distress, appears stated age   Head:    Normocephalic, without obvious abnormality, atraumatic   Throat:   Lips, mucosa, and tongue normal; teeth and gums normal   Neck:   Supple, symmetrical, trachea midline, no adenopathy;        thyroid:  No enlargement/tenderness/nodules; no carotid    bruit or JVD   Back:     Symmetric, no curvature, ROM normal, no CVA tenderness   Lungs:     Clear to auscultation bilaterally, respirations unlabored   Chest wall:    No tenderness or deformity   Heart:    Regular rate and rhythm, S1 and S2 normal, no  "murmur, rub   or gallop   Abdomen:     Soft, non-tender, bowel sounds active all four quadrants,     no masses, no organomegaly   Extremities:   Normal, atraumatic, no cyanosis or edema   Pulses:   2+ and symmetric all extremities   Skin:   Skin color, texture, turgor normal, no rashes or lesions     Results    Lab Results personally reviewed   Lab Results   Component Value Date    CHOL 131 10/10/2024    CHOL 217 (H) 07/29/2024     Lab Results   Component Value Date    HDL 65 10/10/2024    HDL 70 07/29/2024     No components found for: \"LDLCALC\"  Lab Results   Component Value Date    TRIG 72 10/10/2024    TRIG 52 07/29/2024     Lab Results   Component Value Date    WBC 4.6 08/19/2024    HGB 14.8 08/19/2024    HCT 44.3 08/19/2024     08/19/2024     Lab Results   Component Value Date    BUN 15.4 08/19/2024     08/19/2024    CO2 26 08/19/2024         "

## 2025-03-31 ENCOUNTER — TRANSFERRED RECORDS (OUTPATIENT)
Dept: HEALTH INFORMATION MANAGEMENT | Facility: CLINIC | Age: 65
End: 2025-03-31
Payer: MEDICARE

## 2025-04-24 ENCOUNTER — TRANSFERRED RECORDS (OUTPATIENT)
Dept: HEALTH INFORMATION MANAGEMENT | Facility: CLINIC | Age: 65
End: 2025-04-24
Payer: MEDICARE

## 2025-04-28 ENCOUNTER — OFFICE VISIT (OUTPATIENT)
Dept: FAMILY MEDICINE | Facility: CLINIC | Age: 65
End: 2025-04-28
Payer: MEDICARE

## 2025-04-28 VITALS
BODY MASS INDEX: 28.82 KG/M2 | OXYGEN SATURATION: 95 % | HEIGHT: 69 IN | SYSTOLIC BLOOD PRESSURE: 113 MMHG | RESPIRATION RATE: 14 BRPM | WEIGHT: 194.6 LBS | TEMPERATURE: 97.8 F | HEART RATE: 56 BPM | DIASTOLIC BLOOD PRESSURE: 74 MMHG

## 2025-04-28 DIAGNOSIS — Z01.818 PREOP GENERAL PHYSICAL EXAM: Primary | ICD-10-CM

## 2025-04-28 DIAGNOSIS — F41.8 PERFORMANCE ANXIETY: ICD-10-CM

## 2025-04-28 DIAGNOSIS — Z95.5 S/P RIGHT CORONARY ARTERY (RCA) STENT PLACEMENT: ICD-10-CM

## 2025-04-28 DIAGNOSIS — R06.83 SNORING: ICD-10-CM

## 2025-04-28 PROBLEM — K64.9 HEMORRHOIDS: Status: RESOLVED | Noted: 2021-10-04 | Resolved: 2025-04-28

## 2025-04-28 LAB
BASOPHILS # BLD AUTO: 0 10E3/UL (ref 0–0.2)
BASOPHILS NFR BLD AUTO: 0 %
EOSINOPHIL # BLD AUTO: 0.2 10E3/UL (ref 0–0.7)
EOSINOPHIL NFR BLD AUTO: 4 %
ERYTHROCYTE [DISTWIDTH] IN BLOOD BY AUTOMATED COUNT: 12.4 % (ref 10–15)
HCT VFR BLD AUTO: 44 % (ref 40–53)
HGB BLD-MCNC: 14.7 G/DL (ref 13.3–17.7)
IMM GRANULOCYTES # BLD: 0 10E3/UL
IMM GRANULOCYTES NFR BLD: 0 %
LYMPHOCYTES # BLD AUTO: 1.8 10E3/UL (ref 0.8–5.3)
LYMPHOCYTES NFR BLD AUTO: 36 %
MCH RBC QN AUTO: 30.6 PG (ref 26.5–33)
MCHC RBC AUTO-ENTMCNC: 33.4 G/DL (ref 31.5–36.5)
MCV RBC AUTO: 92 FL (ref 78–100)
MONOCYTES # BLD AUTO: 0.5 10E3/UL (ref 0–1.3)
MONOCYTES NFR BLD AUTO: 11 %
NEUTROPHILS # BLD AUTO: 2.5 10E3/UL (ref 1.6–8.3)
NEUTROPHILS NFR BLD AUTO: 50 %
PLATELET # BLD AUTO: 212 10E3/UL (ref 150–450)
RBC # BLD AUTO: 4.8 10E6/UL (ref 4.4–5.9)
WBC # BLD AUTO: 5 10E3/UL (ref 4–11)

## 2025-04-28 PROCEDURE — 85025 COMPLETE CBC W/AUTO DIFF WBC: CPT | Performed by: FAMILY MEDICINE

## 2025-04-28 PROCEDURE — 3078F DIAST BP <80 MM HG: CPT | Performed by: FAMILY MEDICINE

## 2025-04-28 PROCEDURE — 99214 OFFICE O/P EST MOD 30 MIN: CPT | Performed by: FAMILY MEDICINE

## 2025-04-28 PROCEDURE — 3074F SYST BP LT 130 MM HG: CPT | Performed by: FAMILY MEDICINE

## 2025-04-28 PROCEDURE — 36415 COLL VENOUS BLD VENIPUNCTURE: CPT | Performed by: FAMILY MEDICINE

## 2025-04-28 RX ORDER — PROPRANOLOL HYDROCHLORIDE 10 MG/1
20 TABLET ORAL DAILY PRN
Qty: 60 TABLET | Refills: 1 | Status: SHIPPED | OUTPATIENT
Start: 2025-04-28

## 2025-04-28 NOTE — PATIENT INSTRUCTIONS
How to Take Your Medication Before Surgery  Preoperative Medication Instructions   Antiplatelet or Anticoagulation Medication Instructions   - aspirin: Patient is at increased risk of thrombosis (e.g. stenting within the last year), continue aspirin without modification. Consider consultation with cardiology.    - clopidrogel (Plavix), prasugrel (Effient), ticagrelor (Brilinta): No contraindication to stopping Plavix, DO NOT TAKE 5-7 days before surgery.     Additional Medication Instructions  Take all scheduled medications on the day of surgery EXCEPT for modifications listed below:   - Herbal medications and vitamins: DO NOT TAKE 14 days prior to surgery.   - ibuprofen (Advil, Motrin): DO NOT TAKE 1 day before surgery.        Patient Education   Preparing for Your Surgery  For Adults  Getting started  In most cases, a nurse will call to review your health history and instructions. They will give you an arrival time based on your scheduled surgery time. Please be ready to share:  Your doctor's clinic name and phone number  Your medical, surgical, and anesthesia history  A list of allergies and sensitivities  A list of medicines, including herbal treatments and over-the-counter drugs  Whether the patient has a legal guardian (ask how to send us the papers in advance)  Note: You may not receive a call if you were seen at our PAC (Preoperative Assessment Center).  Please tell us if you're pregnant--or if there's any chance you might be pregnant. Some surgeries may injure a fetus (unborn baby), so they require a pregnancy test. Surgeries that are safe for a fetus don't always need a test, and you can choose whether to have one.   Preparing for surgery  Within 10 to 30 days of surgery: Have a pre-op exam (sometimes called an H&P, or History and Physical). This can be done at a clinic or pre-operative center.  If you're having a , you may not need this exam. Talk to your care team.  At your pre-op exam, talk to  your care team about all medicines you take. (This includes CBD oil and any drugs, such as THC, marijuana, and other forms of cannabis.) If you need to stop any medicine before surgery, ask when to start taking it again.  This is for your safety. Many medicines and drugs can make you bleed too much during surgery. Some change how well surgery (anesthesia) drugs work.  Call your insurance company to let them know you're having surgery. (If you don't have insurance, call [phone].)  Call your clinic if there's any change in your health. This includes a scrape or scratch near the surgery site, or any signs of a cold (sore throat, runny nose, cough, rash, fever).  Eating and drinking guidelines  For your safety: Unless your surgeon tells you otherwise, follow the guidelines below.  Eat and drink as normal until 8 hours before you arrive for surgery. After that, no food or milk. You can spit out gum when you arrive.  Drink clear liquids until 2 hours before you arrive. These are liquids you can see through, like water, Gatorade, and Propel Water. They also include plain black coffee and tea (no cream or milk).  No alcohol for 24 hours before you arrive. The night before surgery, stop any drinks that contain THC.  If your care team tells you to take medicine on the morning of surgery, it's okay to take it with a sip of water. No other medicines or drugs are allowed (including CBD oil)--follow your care team's instructions.  If you have questions the day of surgery, call your hospital or surgery center.   Preventing infection  Shower or bathe the night before and the morning of surgery. Follow the instructions your clinic gave you. (If no instructions, use regular soap.)  Don't shave or clip hair near your surgery site. We'll remove the hair if needed.  Don't smoke or vape the morning of surgery. No chewing tobacco for 6 hours before you arrive. A nicotine patch is okay. You may spit out nicotine gum when you  arrive.  For some surgeries, the surgeon will tell you to fully quit smoking and nicotine.  We will make every effort to keep you safe from infection. We will:  Clean our hands often with soap and water (or an alcohol-based hand rub).  Clean the skin at your surgery site with a special soap that kills germs.  Give you a special gown to keep you warm. (Cold raises the risk of infection.)  Wear hair covers, masks, gowns, and gloves during surgery.  Give antibiotic medicine, if prescribed. Not all surgeries need this medicine.  What to bring on the day of surgery  Photo ID and insurance card  Copy of your health care directive, if you have one  Glasses and hearing aids (bring cases)  You can't wear contacts during surgery  Inhaler and eye drops, if you use them (tell us about these when you arrive)  CPAP machine or breathing device, if you use them  A few personal items, if spending the night  If you have . . .  A pacemaker, ICD (cardiac defibrillator), or other implant: Bring the ID card.  An implanted stimulator: Bring the remote control.  A legal guardian: Bring a copy of the certified (court-stamped) guardianship papers.  Please remove any jewelry, including body piercings. Leave jewelry and other valuables at home.  If you're going home the day of surgery  You must have a responsible adult drive you home. They should stay with you overnight as well.  If you don't have someone to stay with you, and you aren't safe to go home alone, we may keep you overnight. Insurance often won't pay for this.  After surgery  If it's hard to control your pain or you need more pain medicine, please call your surgeon's office.  Questions?   If you have any questions for your care team, list them here:    ____________________________________________________________________________________________________________________________________________________________________________________________________________________________________________________________  For informational purposes only. Not to replace the advice of your health care provider. Copyright   2003, 2019 [facility]. All rights reserved. Clinically reviewed by Evan Colon MD. [employer] [id number] - REV 08/24.

## 2025-04-28 NOTE — PROGRESS NOTES
Preoperative Evaluation  [facility]  [address] [facility] [address]  Phone: [phone]  Fax: [phone]  Primary Provider: Feliberto Gilman MD  Pre-op Performing Provider: Feliberto Gilman MD  Apr 28, 2025 4/25/2025   Surgical Information   What procedure is being done? Knee scope poss repair   Facility or Hospital where procedure/surgery will be performed: [facility]   Who is doing the procedure / surgery? Dr Anderson   Date of surgery / procedure: 04/30/2025   Time of surgery / procedure: Approximately 8:00 am   Where do you plan to recover after surgery? at home with family     Fax number for surgical facility: [phone]    Assessment & Plan     ICD-10-CM    1. Preop general physical exam  Z01.818 CBC with platelets and differential     CBC with platelets and differential      2. Performance anxiety  F41.8 propranolol (INDERAL) 10 MG tablet      3. Snoring  R06.83       4. S/P right coronary artery (RCA) stent placement  Z95.5           The proposed surgical procedure is considered INTERMEDIATE risk.          Possible Sleep Apnea: This has been evaluated.  There was mild sleep apnea.  Patient did not tolerate CPAP machine and now uses mouthguard with some benefit.              - No identified additional risk factors other than previously addressed    Preoperative Medication Instructions  Antiplatelet or Anticoagulation Medication Instructions   - aspirin: Patient is at increased risk of thrombosis (e.g. stenting within the last year), continue aspirin without modification. Consider consultation with cardiology.    - clopidrogel (Plavix), prasugrel (Effient), ticagrelor (Brilinta): No contraindication to stopping Plavix, DO NOT TAKE 5-7 days before surgery.     Additional Medication Instructions  Take all scheduled medications on the day of surgery EXCEPT for modifications listed below:   - Herbal medications and vitamins: DO NOT TAKE 14  days prior to surgery.   - ibuprofen (Advil, Motrin): DO NOT TAKE 1 day before surgery.     Recommendation  Approval given to proceed with proposed procedure, without further diagnostic evaluation.  Following issues addressed  1: Performance anxiety: Okay to use propranolol as before  2: Snoring.  Controlled with mouthguard.  3: Status post coronary artery stent placement.  Reviewed the chart.  Has had repeat stress test done that was stable.  Okay to hold Plavix as he is 6 months out of the procedure and has got a clearance from cardiology.      Follow-up       Elvin Feliciano is a 64 year old, presenting for the following:  Pre-Op Exam (DOS 04/30/2025, Right knee surgery @ [facility] in [facility] with Dr. Anderson)          4/28/2025     8:25 AM   Additional Questions   Roomed by Damari Pete CMA     HPI: Undergoing meniscal tear repaired with [facility]          4/25/2025   Pre-Op Questionnaire   Have you ever had a heart attack or stroke? No   Have you ever had surgery on your heart or blood vessels, such as a stent placement, a coronary artery bypass, or surgery on an artery in your head, neck, heart, or legs? (!) YES Stents placed   Do you have chest pain with activity? No   Do you have a history of heart failure? No   Do you currently have a cold, bronchitis or symptoms of other infection? No   Do you have a cough, shortness of breath, or wheezing? No   Do you or anyone in your family have previous history of blood clots? No   Do you or does anyone in your family have a serious bleeding problem such as prolonged bleeding following surgeries or cuts? No   Have you ever had problems with anemia or been told to take iron pills? No   Have you had any abnormal blood loss such as black, tarry or bloody stools? No   Have you ever had a blood transfusion? No   Are you willing to have a blood transfusion if it is medically needed before, during, or after your surgery? Yes   Have you or any of your relatives  ever had problems with anesthesia? No   Do you have sleep apnea, excessive snoring or daytime drowsiness? (!) YES- Mild   Do you have a CPAP machine? (!) NO Unable to use- uses mouth guard   Do you have any artifical heart valves or other implanted medical devices like a pacemaker, defibrillator, or continuous glucose monitor? No   Do you have artificial joints? No   Are you allergic to latex? No     Advance Care Planning    Discussed advance care planning with patient; informed AVS has link to [employer].    Preoperative Review of    reviewed - no record of controlled substances prescribed.      Status of Chronic Conditions:  See problem list for active medical problems.  Problems all longstanding and stable, except as noted/documented.  See ROS for pertinent symptoms related to these conditions.    Patient Active Problem List    Diagnosis Date Noted    Transient left leg weakness 10/10/2024     Priority: Medium    S/P right coronary artery (RCA) stent placement 08/19/2024     Priority: Medium    Abnormal stress test 08/19/2024     Priority: Medium    Coronary arteriosclerosis due to lipid rich plaque 08/16/2024     Priority: Medium    Pure hypercholesterolemia 08/16/2024     Priority: Medium    Hemorrhoids 10/04/2021     Priority: Medium    Polyp of colon 10/04/2021     Priority: Medium    Erectile dysfunction 04/24/2017     Priority: Medium    Anxiety      Priority: Medium     Created by Conversion  Replacement Utility updated for latest IMO load        Benign localized hyperplasia of prostate with urinary obstruction and other lower urinary tract symptoms (LUTS)(600.21) 11/13/2014     Priority: Medium    Benign prostatic hyperplasia 11/13/2014     Priority: Medium    Joint Pain, Localized In The Knee      Priority: Medium     Created by Conversion        Tingling (Paresthesia)      Priority: Medium     Created by Conversion        Joint Pain, Localized In The Shoulder      Priority: Medium      Created by Conversion          Past Medical History:   Diagnosis Date    Kidney stones      Past Surgical History:   Procedure Laterality Date    ARTHROSCOPY SHOULDER ROTATOR CUFF REPAIR Bilateral     COLONOSCOPY  2010    CV CORONARY ANGIOGRAM N/A 8/19/2024    Procedure: Coronary Angiogram;  Surgeon: Shubham Dinh MD;  Location: [facility] CV    CV LEFT HEART CATH N/A 8/19/2024    Procedure: Left Heart Catheterization;  Surgeon: Shubham Dinh MD;  Location: [facility] CV    CV PCI N/A 8/19/2024    Procedure: Percutaneous Coronary Intervention;  Surgeon: Shubham Dinh MD;  Location: [facility] CV    EYE SURGERY  1985    Over 30 years ago     Current Outpatient Medications   Medication Sig Dispense Refill    aspirin 81 MG EC tablet Take 81 mg by mouth daily      atorvastatin (LIPITOR) 80 MG tablet Take 1 tablet (80 mg) by mouth daily 90 tablet 3    b complex vitamins tablet [B COMPLEX VITAMINS TABLET] Take 1 tablet by mouth daily.      clopidogrel (PLAVIX) 75 MG tablet Take 1 tablet (75 mg) by mouth daily. 90 tablet 2    Coenzyme Q10 (CO Q 10 PO) Take 20 mg by mouth daily      glucosamine-chondroitin (COSAMIN DS) 500-400 MG tablet Take 2 tablets by mouth daily      multivitamin capsule [MULTIVITAMIN CAPSULE] Take 1 capsule by mouth daily.      nitroGLYcerin (NITROSTAT) 0.4 MG sublingual tablet For chest pain place 1 tablet under the tongue every 5 minutes for 3 doses. If symptoms persist 5 minutes after 1st dose call 911. 10 tablet 0    propranolol (INDERAL) 10 MG tablet Take 2 tablets (20 mg) by mouth daily as needed (For performance anxiety). 10 to 20 mg orally 30 to 60 minutes prior to the anxiety-inducing situation 60 tablet 1       No Known Allergies     Social History     Tobacco Use    Smoking status: Never     Passive exposure: Yes    Smokeless tobacco: Never    Tobacco comments:     spouse outside   Substance Use Topics    Alcohol use: Yes     Comment: Alcoholic Drinks/day:  "2-4 drinks per week ( a Cooler)     Family History   Problem Relation Age of Onset    Breast Cancer Mother     Multiple Sclerosis Mother     Depression Sister     Multiple Sclerosis Sister     No Known Problems Father      History   Drug Use Unknown             Review of Systems  Constitutional, neuro, ENT, endocrine, pulmonary, cardiac, gastrointestinal, genitourinary, musculoskeletal, integument and psychiatric systems are negative, except as otherwise noted.    Objective    /74 (BP Location: Left arm, Patient Position: Sitting, Cuff Size: Adult Regular)   Pulse 56   Temp 97.8  F (36.6  C) (Oral)   Resp 14   Ht 1.74 m (5' 8.5\")   Wt 88.3 kg (194 lb 9.6 oz)   SpO2 95%   BMI 29.16 kg/m     Estimated body mass index is 29.16 kg/m  as calculated from the following:    Height as of this encounter: 1.74 m (5' 8.5\").    Weight as of this encounter: 88.3 kg (194 lb 9.6 oz).  Physical Exam  GENERAL: alert and no distress  EYES: Eyes grossly normal to inspection, PERRL and conjunctivae and sclerae normal  HENT: normal cephalic/atraumatic, both ears: occluded with wax, nose and mouth without ulcers or lesions, oropharynx clear, and oral mucous membranes moist  NECK: no adenopathy, no asymmetry, masses, or scars  RESP: lungs clear to auscultation - no rales, rhonchi or wheezes  CV: regular rate and rhythm, normal S1 S2, no S3 or S4, no murmur, click or rub, no peripheral edema  ABDOMEN: soft, nontender, no hepatosplenomegaly, no masses and bowel sounds normal  MS: no gross musculoskeletal defects noted, no edema    Recent Labs   Lab Test 08/19/24  0811 07/29/24  1045   HGB 14.8 15.0    223    140   POTASSIUM 4.5 5.0   CR 0.83 0.84        Diagnostics  Labs pending at this time.  Results will be reviewed when available.   No EKG this visit, completed in the last 90 days.  NM MPI treadmill test completed on 02/05/2025.    Revised Cardiac Risk Index (RCRI)  The patient has the following serious " cardiovascular risks for perioperative complications:   - Coronary Artery Disease (MI, positive stress test, angina, Qs on EKG) = 1 point     RCRI Interpretation: 1 point: Class II (low risk - 0.9% complication rate)         Signed Electronically by: Feliberto Gilman MD  A copy of this evaluation report is provided to the requesting physician.

## 2025-05-12 ENCOUNTER — TRANSFERRED RECORDS (OUTPATIENT)
Dept: HEALTH INFORMATION MANAGEMENT | Facility: CLINIC | Age: 65
End: 2025-05-12
Payer: MEDICARE

## 2025-06-09 ENCOUNTER — TRANSFERRED RECORDS (OUTPATIENT)
Dept: HEALTH INFORMATION MANAGEMENT | Facility: CLINIC | Age: 65
End: 2025-06-09
Payer: MEDICARE

## 2025-06-27 ENCOUNTER — RESULTS FOLLOW-UP (OUTPATIENT)
Dept: FAMILY MEDICINE | Facility: CLINIC | Age: 65
End: 2025-06-27

## 2025-06-30 ENCOUNTER — PATIENT OUTREACH (OUTPATIENT)
Dept: CARE COORDINATION | Facility: CLINIC | Age: 65
End: 2025-06-30
Payer: MEDICARE

## 2025-07-01 ENCOUNTER — RESULTS FOLLOW-UP (OUTPATIENT)
Dept: CARDIOLOGY | Facility: CLINIC | Age: 65
End: 2025-07-01
Payer: MEDICARE

## 2025-07-03 DIAGNOSIS — F41.8 PERFORMANCE ANXIETY: ICD-10-CM

## 2025-07-03 RX ORDER — PROPRANOLOL HYDROCHLORIDE 10 MG/1
20 TABLET ORAL DAILY PRN
Qty: 60 TABLET | Refills: 1 | Status: CANCELLED | OUTPATIENT
Start: 2025-07-03

## 2025-07-14 ENCOUNTER — TRANSFERRED RECORDS (OUTPATIENT)
Dept: HEALTH INFORMATION MANAGEMENT | Facility: CLINIC | Age: 65
End: 2025-07-14
Payer: MEDICARE

## 2025-08-21 ENCOUNTER — MYC REFILL (OUTPATIENT)
Dept: FAMILY MEDICINE | Facility: CLINIC | Age: 65
End: 2025-08-21
Payer: MEDICARE

## 2025-08-21 DIAGNOSIS — F41.8 PERFORMANCE ANXIETY: ICD-10-CM

## 2025-08-21 RX ORDER — PROPRANOLOL HYDROCHLORIDE 10 MG/1
20 TABLET ORAL DAILY PRN
Qty: 60 TABLET | Refills: 1 | Status: SHIPPED | OUTPATIENT
Start: 2025-08-21

## (undated) DEVICE — CATH BALLOON EMERGE 2.5X12MM H7493918912250

## (undated) DEVICE — CATH BALLOON NC EMERGE 2.25X15MM H7493926715220

## (undated) DEVICE — VALVE HEMOSTASIS .096" COPILOT MECH 1003331

## (undated) DEVICE — ELECTRODE DEFIB CADENCE 22550R

## (undated) DEVICE — CATH BALLOON NC EMERGE 3.50X12MM H7493926712350

## (undated) DEVICE — KIT HAND CONTROL ACIST 014644 AR-P54

## (undated) DEVICE — EXCHANGE WIRE .035 260 STAR/JFC/035/260/ M001491681

## (undated) DEVICE — CATH GUIDING 5FR AL .75 LA5AL75

## (undated) DEVICE — CATH BALLOON NC EMERGE 2.50X15MM H7493926715250

## (undated) DEVICE — SLEEVE TR BAND RADIAL COMPRESSION DEVICE 24CM TRB24-REG

## (undated) DEVICE — CATH BALLOON EMERGE 2.0X20MM H7493918920200

## (undated) DEVICE — CATH DIAGNOSTIC RADIAL 5FR TIG 4.0

## (undated) DEVICE — MANIFOLD KIT ANGIO AUTOMATED 014613

## (undated) DEVICE — WIRE GUIDE HI-TRQ  WHISPER MS JTIP 0.014"X190CM 1005357HJ

## (undated) DEVICE — GUIDEWIRE FORTE FLOPPY J TOP 34949-05J

## (undated) DEVICE — CATH BALLOON EMERGE 2.25X20MMH7493918920220

## (undated) DEVICE — CUSTOM PACK CORONARY SAN5BCRHEA

## (undated) DEVICE — CATH BALLO0N SHOCKWAVE C2+ 3.0 X12MM CORONARY C2PIVL3012

## (undated) DEVICE — CATH LAUNCHER 6FR EBU 4.0 LA6EBU40

## (undated) DEVICE — DEVICE INFLATION SYR W/ HEMOSTASIS VALVE 12IN EXT IN4904

## (undated) DEVICE — SHTH INTRO 0.021IN ID 6FR DIA

## (undated) DEVICE — SYR ANGIOGRAPHY MULTIUSE KIT ACIST 014612

## (undated) RX ORDER — LIDOCAINE HYDROCHLORIDE 10 MG/ML
INJECTION, SOLUTION EPIDURAL; INFILTRATION; INTRACAUDAL; PERINEURAL
Status: DISPENSED
Start: 2024-08-19

## (undated) RX ORDER — FENTANYL CITRATE 50 UG/ML
INJECTION, SOLUTION INTRAMUSCULAR; INTRAVENOUS
Status: DISPENSED
Start: 2024-08-19

## (undated) RX ORDER — DIAZEPAM 5 MG
TABLET ORAL
Status: DISPENSED
Start: 2024-08-19

## (undated) RX ORDER — HEPARIN SODIUM 1000 [USP'U]/ML
INJECTION, SOLUTION INTRAVENOUS; SUBCUTANEOUS
Status: DISPENSED
Start: 2024-08-19

## (undated) RX ORDER — CLOPIDOGREL 300 MG/1
TABLET, FILM COATED ORAL
Status: DISPENSED
Start: 2024-08-19